# Patient Record
Sex: MALE | Race: WHITE | NOT HISPANIC OR LATINO | Employment: OTHER | URBAN - NONMETROPOLITAN AREA
[De-identification: names, ages, dates, MRNs, and addresses within clinical notes are randomized per-mention and may not be internally consistent; named-entity substitution may affect disease eponyms.]

---

## 2017-07-20 ENCOUNTER — AMBULATORY - GICH (OUTPATIENT)
Dept: UROLOGY | Facility: OTHER | Age: 67
End: 2017-07-20

## 2017-07-20 ENCOUNTER — HISTORY (OUTPATIENT)
Dept: UROLOGY | Facility: OTHER | Age: 67
End: 2017-07-20

## 2017-07-20 DIAGNOSIS — Z85.51 PERSONAL HISTORY OF MALIGNANT NEOPLASM OF BLADDER: ICD-10-CM

## 2017-12-28 NOTE — PROGRESS NOTES
Patient Information     Patient Name MRN Sex     Jayy Cole [id number] Male 1950      Progress Notes by Ernesto Vasques MD at 2017  8:30 AM     Author:  Ernesto Vasques MD Service:  (none) Author Type:  Physician     Filed:  2017  9:19 AM Encounter Date:  2017 Status:  Signed     :  Ernesto Vasques MD (Physician)            PCP:  Dr Frederic Venegas    Preprocedure diagnosis  History of bladder cancer    Postprocedure diagnosis  History of bladder cancer    Procedure  Flexible Cystourethroscopy    Surgeon  Ernesto Vasques MD    Anesthesia  2% lidocaine jelly intraurethrally    Complications  None    Indications  67 y.o. male undergoing a flexible cystoscopy for the above mentioned indications.    Findings  Cystoscopic findings included a normal anterior urethra.    There was not a prominent median lobe.    The lateral lobes were not obstructive in appearance.  The bladder appeared to be normal capacity.    There were no tumors, stones or foreign bodies.    The orifices were slit-shaped and in their normal location.    Procedure  The patient was placed in supine position and prepped and draped in sterile fashion with lidocaine jelly per urethra for anesthesia.    I passed a lubricated 14F flexible cystoscope through the penile urethra and into the bladder and the bladder was completely visualized.  The cystoscope was retroflexed and the bladder neck and prostate visualized.    The cystoscope was slowly withdrawn while visualizing the urethra and the procedure terminated.    The patient tolerated the procedure well.      Plan  Cystoscopy in 1 year

## 2017-12-28 NOTE — PATIENT INSTRUCTIONS
Patient Information     Patient Name MRN Sex Jayy Piper [id number] Male 1950      Patient Instructions by Brenda Bell RN at 2017  8:30 AM     Author:  Brenda Bell RN Service:  (none) Author Type:  NURS- Registered Nurse     Filed:  2017  9:08 AM Encounter Date:  2017 Status:  Signed     :  Brenda Bell RN (NURS- Registered Nurse)            Home Care after Cystoscopy  Follow these guidelines for your care after your procedure.    Activity  No limitations    Bathing or showering  No limitations    Symptoms  You may notice some burning with urination but this usually resolves after 1-2 days.  You may also notice small amounts of blood in your urine.  Please increase water intake for the next few days to help with these symptoms.    Contacts  General Questions: [phone]  Appointments:  [phone]  Emergencies:  [phone]    When to call the clinic  If you develop any of the following symptoms please call the clinic immediately.  If the clinic is closed please be seen at an urgent care clinic or the Emergency Department.  - Burning with urination that worsens after 2 days  - Unable to urinate causing severe pelvic pain  - Fevers of greater than 101 degrees F  - Flank pain that is not responding to pain medication    Follow up  Please follow up as discussed at the appointment.

## 2017-12-30 NOTE — NURSING NOTE
Patient Information     Patient Name MRN Sex     Jayy Cole [id number] Male 1950      Nursing Note by Brenda Bell RN at 2017  8:30 AM     Author:  Brenda Bell RN Service:  (none) Author Type:  NURS- Registered Nurse     Filed:  2017  9:16 AM Encounter Date:  2017 Status:  Signed     :  Brenda Bell RN (NURS- Registered Nurse)            Patient positioned in supine position, perineum area prepped with chlorhexidene Gluconate and patient draped per sterile technique. Per verbal order read back by Ernesto Vasques MD, Urojet 10mL 2% lidocaine jelly to be instilled into urethra.  Urojet- 10ml 2% Lidocaine jelly instilled into the urethra.    Urojet 2%  Lot#: [id number]  Expiration date: 3/19  : [employer]  NDC: [address]-0    [place] Protocol    A. Pre-procedure verification complete yes  1-relevant information / documentation available, reviewed and properly matched to the patient; 2-consent accurate and complete, 3-equipment and supplies available    B. Site marking complete N/A  Site marked if not in continuous attendance with patient    C. TIME OUT completed yes  Time Out was conducted just prior to starting procedure to verify the eight required elements: 1-patient identity, 2-consent accurate and complete, 3-position, 4-correct side/site marked (if applicable), 5-procedure, 6-relevant images / results properly labeled and displayed (if applicable), 7-antibiotics / irrigation fluids (if applicable), 8-safety precautions.    After procedure perineum area rinsed. Discharge instructions reviewed with patient. Patient verbalized understanding of discharge instructions and discharged ambulatory.

## 2018-01-24 ENCOUNTER — DOCUMENTATION ONLY (OUTPATIENT)
Dept: FAMILY MEDICINE | Facility: OTHER | Age: 68
End: 2018-01-24

## 2018-01-24 PROBLEM — F20.9 SCHIZOPHRENIA (H): Status: ACTIVE | Noted: 2018-01-24

## 2018-01-24 PROBLEM — J44.9 COPD (CHRONIC OBSTRUCTIVE PULMONARY DISEASE) (H): Status: ACTIVE | Noted: 2018-01-24

## 2018-01-24 PROBLEM — I49.9 CARDIAC ARRHYTHMIA: Status: ACTIVE | Noted: 2018-01-24

## 2018-01-24 RX ORDER — ACETAMINOPHEN 500 MG
1000 TABLET ORAL 2 TIMES DAILY
COMMUNITY

## 2018-01-24 RX ORDER — FLUPHENAZINE HYDROCHLORIDE 5 MG/1
5 TABLET ORAL 2 TIMES DAILY
Status: ON HOLD | COMMUNITY
End: 2024-04-28

## 2018-01-24 RX ORDER — IBUPROFEN 800 MG/1
1 TABLET, FILM COATED ORAL 3 TIMES DAILY PRN
COMMUNITY
End: 2024-04-28

## 2018-01-24 RX ORDER — ALENDRONATE SODIUM 70 MG/1
70 TABLET ORAL
COMMUNITY
End: 2024-06-07

## 2018-01-27 VITALS — HEART RATE: 80 BPM | BODY MASS INDEX: 19.82 KG/M2 | RESPIRATION RATE: 16 BRPM | HEIGHT: 68 IN | WEIGHT: 130.8 LBS

## 2018-07-23 NOTE — PROGRESS NOTES
Patient Information     Patient Name  Jayy Cole MRN  [id number] Sex  Male   1950      Letter by Ernesto Vasques MD at      Author:  Ernesto Vasques MD Service:  (none) Author Type:  (none)    Filed:   Encounter Date:  2017 Status:  (Other)           Frederic Venegas MD  [address]  [address]  [place] [address]          2017    Re:  Jayy Cole       : 1950  [address]  [facility] [address]    Appointment Date: 17      Dear  Dr Venegas,    Thank you for allowing me to take part in this patient s care.  I copied my note below from today's clinic visit for your records.  Please feel free to contact me with any questions      Warm regards,            Ernesto Vasques MD, PharmD, [facility]  Urologist  [address] [place] [address]  Appointments: [phone]                    PCP:  Dr Frederic Venegas    Preprocedure diagnosis  History of bladder cancer    Postprocedure diagnosis  History of bladder cancer    Procedure  Flexible Cystourethroscopy    Surgeon  Ernesto Vasques MD    Anesthesia  2% lidocaine jelly intraurethrally    Complications  None    Indications  67 y.o. male undergoing a flexible cystoscopy for the above mentioned indications.    Findings  Cystoscopic findings included a normal anterior urethra.    There was not a prominent median lobe.    The lateral lobes were not obstructive in appearance.  The bladder appeared to be normal capacity.    There were no tumors, stones or foreign bodies.    The orifices were slit-shaped and in their normal location.    Procedure  The patient was placed in supine position and prepped and draped in sterile fashion with lidocaine jelly per urethra for anesthesia.    I passed a lubricated 14F flexible cystoscope through the penile urethra and into the bladder and the bladder was completely visualized.  The cystoscope was retroflexed and the bladder neck and prostate visualized.    The cystoscope was slowly withdrawn while  visualizing the urethra and the procedure terminated.    The patient tolerated the procedure well.      Plan  Surveillance cystoscopy annually

## 2018-08-16 ENCOUNTER — OFFICE VISIT (OUTPATIENT)
Dept: UROLOGY | Facility: OTHER | Age: 68
End: 2018-08-16
Attending: UROLOGY
Payer: MEDICARE

## 2018-08-16 VITALS — WEIGHT: 134.4 LBS | HEART RATE: 98 BPM | HEIGHT: 68 IN | BODY MASS INDEX: 20.37 KG/M2 | RESPIRATION RATE: 14 BRPM

## 2018-08-16 DIAGNOSIS — Z85.51 PERSONAL HISTORY OF MALIGNANT NEOPLASM OF BLADDER: Primary | ICD-10-CM

## 2018-08-16 PROCEDURE — 52000 CYSTOURETHROSCOPY: CPT

## 2018-08-16 PROCEDURE — G0463 HOSPITAL OUTPT CLINIC VISIT: HCPCS | Mod: 25

## 2018-08-16 PROCEDURE — 52000 CYSTOURETHROSCOPY: CPT | Performed by: UROLOGY

## 2018-08-16 ASSESSMENT — PAIN SCALES - GENERAL: PAINLEVEL: NO PAIN (0)

## 2018-08-16 NOTE — MR AVS SNAPSHOT
After Visit Summary   8/16/2018    Jayy Cole    MRN: [id number]           Patient Information     Date Of Birth          1950        Visit Information        Provider Department      8/16/2018 10:00 AM Ernesto Vasques MD [facility]        Care Instructions    Home Care after Cystoscopy  Follow these guidelines for your care after your procedure.    Activity  No limitations    Bathing or showering  No limitations    Symptoms  You may notice some burning with urination but this usually resolves after 1-2 days.  You may also notice small amounts of blood in your urine.  Please increase water intake for the next few days to help with these symptoms.    Contacts  General Questions: [phone]  Appointments:  [phone]  Emergencies:  [phone]    When to call the clinic  If you develop any of the following symptoms please call the clinic immediately.  If the clinic is closed please be seen at an urgent care clinic or the Emergency Department.  - Burning with urination that worsens after 2 days  - Unable to urinate causing severe pelvic pain  - Fevers of greater than 101 degrees F  - Flank pain that is not responding to pain medication    Follow up  Please follow up as discussed at the appointment.            Follow-ups after your visit        Who to contact     If you have questions or need follow up information about today's clinic visit or your schedule please contact [facility] directly at [phone].  Normal or non-critical lab and imaging results will be communicated to you by MyChart, letter or phone within 4 business days after the clinic has received the results. If you do not hear from us within 7 days, please contact the clinic through MyChart or phone. If you have a critical or abnormal lab result, we will notify you by phone as soon as possible.  Submit refill requests through [employer] or call your pharmacy and they will forward the  refill request to us. Please allow 3 business days for your refill to be completed.          Additional Information About Your Visit        Care EveryWhere ID     This is your Care EveryWhere ID. This could be used by other organizations to access your [facility] medical records  [id number]         Blood Pressure from Last 3 Encounters:   No data found for BP    Weight from Last 3 Encounters:   07/20/17 59.3 kg (130 lb 12.8 oz)   07/27/16 62.8 kg (138 lb 6.4 oz)   04/18/14 63 kg (139 lb)              Today, you had the following     No orders found for display       Primary Care Provider Office Phone # Fax #    Skyler Velasquez [phone]       [address] [place] [address]        Equal Access to Services     SURESH CORTÉS : Hadii cameron donis hadasho Soomaali, waaxda luqadaha, qaybta kaalmada adeegyada, waxellyn herrera haysoraya rodrigez . So [facility] [phone].    ATENCIÓN: Si habla español, tiene a purvis disposición servicios gratuitos de asistencia lingüística. Llame al [phone].    We comply with applicable federal civil rights laws and [place] laws. We do not discriminate on the basis of race, color, national origin, age, disability, sex, sexual orientation, or gender identity.            Thank you!     Thank you for choosing [facility]  for your care. Our goal is always to provide you with excellent care. Hearing back from our patients is one way we can continue to improve our services. Please take a few minutes to complete the written survey that you may receive in the mail after your visit with us. Thank you!             Your Updated Medication List - Protect others around you: Learn how to safely use, store and throw away your medicines at www.disposemymeds.org.          This list is accurate as of 8/16/18 10:28 AM.  Always use your most recent med list.                   Brand Name Dispense Instructions for use Diagnosis     acetaminophen 500 MG tablet    TYLENOL     Take 1,000 mg by mouth 2 times daily as needed        alendronate 70 MG tablet    FOSAMAX     Take 70 mg by mouth every 7 days        aspirin 81 MG tablet      Take 81 mg by mouth daily with food        fluPHENAZine 5 MG tablet    PROLIXIN     Take 5 mg by mouth 2 times daily        ibuprofen 800 MG tablet    ADVIL/MOTRIN     Take 1 tablet by mouth 3 times daily as needed

## 2018-08-16 NOTE — PATIENT INSTRUCTIONS

## 2018-08-16 NOTE — PROGRESS NOTES
Preprocedure diagnosis  History of bladder cancer    Postprocedure diagnosis  History of bladder cancer    Procedure  Flexible Cystourethroscopy    Surgeon  Ernesto Vasques MD    Anesthesia  2% lidocaine jelly intraurethrally    Complications  None    Indications  68 y.o. male undergoing a flexible cystoscopy for the above mentioned indications.    Findings  Cystoscopic findings included a normal anterior urethra.    There was not a prominent median lobe.    The lateral lobes were not obstructive in appearance.  The bladder appeared to be normal capacity.    There were no tumors, stones or foreign bodies.    The orifices were slit-shaped and in their normal location.    Procedure  The patient was placed in supine position and prepped and draped in sterile fashion with lidocaine jelly per urethra for anesthesia.    I passed a lubricated 14F flexible cystoscope through the penile urethra and into the bladder and the bladder was completely visualized.  The cystoscope was retroflexed and the bladder neck and prostate visualized.    The cystoscope was slowly withdrawn while visualizing the urethra and the procedure terminated.    The patient tolerated the procedure well.      Plan  Annual surveillance cystoscopy   -He did mention blood per stool and I recommended following up with PCP/general surgeon to consider colonoscopy

## 2018-08-16 NOTE — NURSING NOTE
Patient positioned in supine position, perineum area prepped with chlorhexidene Gluconate and patient draped per sterile technique. Per verbal order read back by Ernesto Vasques MD, Urojet 10mL 2% lidocaine jelly to be instilled into urethra.  Urojet- 10ml 2% Lidocaine jelly instilled into the urethra.    Urojet 2%  Lot#: [id number]  Expiration date: 03/2020  : [employer]  NDC: [address]-1    [place] Protocol    A. Pre-procedure verification complete Yes  1-relevant information / documentation available, reviewed and properly matched to the patient; 2-consent accurate and complete, 3-equipment and supplies available    B. Site marking complete N/A  Site marked if not in continuous attendance with patient    C. TIME OUT completed Yes  Time Out was conducted just prior to starting procedure to verify the eight required elements: 1-patient identity, 2-consent accurate and complete, 3-position, 4-correct side/site marked (if applicable), 5-procedure, 6-relevant images / results properly labeled and displayed (if applicable), 7-antibiotics / irrigation fluids (if applicable), 8-safety precautions.    After procedure perineum area rinsed. Discharge instructions reviewed with patient. Patient verbalized understanding of discharge instructions and discharged ambulatory.  Anna Schmitt..................8/16/2018  11:24 AM

## 2019-07-29 ENCOUNTER — OFFICE VISIT (OUTPATIENT)
Dept: UROLOGY | Facility: OTHER | Age: 69
End: 2019-07-29
Attending: UROLOGY
Payer: MEDICARE

## 2019-07-29 VITALS — BODY MASS INDEX: 20.25 KG/M2 | RESPIRATION RATE: 12 BRPM | HEART RATE: 92 BPM | WEIGHT: 133.2 LBS

## 2019-07-29 DIAGNOSIS — Z85.51 HISTORY OF BLADDER CANCER: Primary | ICD-10-CM

## 2019-07-29 PROCEDURE — 52000 CYSTOURETHROSCOPY: CPT | Performed by: UROLOGY

## 2019-07-29 PROCEDURE — G0463 HOSPITAL OUTPT CLINIC VISIT: HCPCS | Mod: 25

## 2019-07-29 RX ORDER — MIRTAZAPINE 15 MG/1
15 TABLET, FILM COATED ORAL AT BEDTIME
Status: ON HOLD | COMMUNITY
End: 2024-04-28

## 2019-07-29 RX ORDER — CHLORPROMAZINE HYDROCHLORIDE 25 MG/1
25 TABLET, FILM COATED ORAL AT BEDTIME
Status: ON HOLD | COMMUNITY
End: 2024-04-29

## 2019-07-29 RX ORDER — ALBUTEROL SULFATE 90 UG/1
2 AEROSOL, METERED RESPIRATORY (INHALATION) 2 TIMES DAILY
COMMUNITY
End: 2024-06-07

## 2019-07-29 ASSESSMENT — PAIN SCALES - GENERAL: PAINLEVEL: EXTREME PAIN (8)

## 2019-07-29 NOTE — NURSING NOTE
Patient positioned in supine position, perineum area prepped with chlorhexidene Gluconate and patient draped per sterile technique. Per verbal order read back by Ernesto Vasques MD, Urojet 10mL 2% lidocaine jelly to be instilled into urethra.  Urojet- 10ml 2% Lidocaine jelly instilled into the urethra.    Urojet 2%  Lot#: [id number]  Expiration date: 3/21  : [employer]  NDC: [address]-9    [place] Protocol    A. Pre-procedure verification complete Yes  1-relevant information / documentation available, reviewed and properly matched to the patient; 2-consent accurate and complete, 3-equipment and supplies available    B. Site marking complete N/A  Site marked if not in continuous attendance with patient    C. TIME OUT completed Yes  Time Out was conducted just prior to starting procedure to verify the eight required elements: 1-patient identity, 2-consent accurate and complete, 3-position, 4-correct side/site marked (if applicable), 5-procedure, 6-relevant images / results properly labeled and displayed (if applicable), 7-antibiotics / irrigation fluids (if applicable), 8-safety precautions.    After procedure perineum area rinsed. Discharge instructions reviewed with patient. Patient verbalized understanding of discharge instructions and discharged ambulatory.  Brenda Bell..................7/29/2019  2:30 PM

## 2019-07-29 NOTE — PROGRESS NOTES
Preprocedure diagnosis  History of bladder cancer    Postprocedure diagnosis  History of bladder cancer    Procedure  Flexible Cystourethroscopy    Surgeon  Ernesto Vasques MD    Anesthesia  2% lidocaine jelly intraurethrally    Complications  None    Indications  The patient is undergoing a flexible cystoscopy for the above mentioned indications.    Findings  Cystoscopic findings included a normal anterior urethra.    There was not a prominent median lobe.    The lateral lobes were not obstructive in appearance.  The bladder appeared to be normal capacity.    There were no tumors, stones or foreign bodies.    The orifices were slit-shaped and in their normal location.    Procedure  The patient was placed in supine position and prepped and draped in sterile fashion with lidocaine jelly per urethra for anesthesia.    I passed a lubricated 14F flexible cystoscope through the penile urethra and into the bladder and the bladder was completely visualized.  The cystoscope was retroflexed and the bladder neck and prostate visualized.    The cystoscope was slowly withdrawn while visualizing the urethra and the procedure terminated.    The patient tolerated the procedure well.      Plan  Annual surveillance cystoscopy

## 2019-07-29 NOTE — PATIENT INSTRUCTIONS

## 2020-03-03 ENCOUNTER — OFFICE VISIT (OUTPATIENT)
Dept: OTOLARYNGOLOGY | Facility: OTHER | Age: 70
End: 2020-03-03
Attending: OTOLARYNGOLOGY
Payer: MEDICARE

## 2020-03-03 DIAGNOSIS — K14.8 OTHER DISEASES OF TONGUE: Primary | ICD-10-CM

## 2020-03-03 PROCEDURE — G0463 HOSPITAL OUTPT CLINIC VISIT: HCPCS

## 2020-03-10 NOTE — PROGRESS NOTES
document embedded image  Patient Name: Jayy Cole    Address: [address]    YOB: 1950    [place] [address]    MR Number: [id number]    Phone: [phone]  PCP: Hernando Clifford PA-C            Appointment Date: 03/03/20   Visit Provider: Andrew See MD    cc: Hernando Clifford PA-C; ~    ENT Progress Note    Visit Reasons: Lesion on Tongue/consult    HPI  History of Present Illness  Chief complaint:  Tongue lesion    History  The patient is a 69-year-old man who was sent in today for evaluation of a tongue lesion.  This is on his right posterior lateral tongue.  He feels that has resolved.  He has no lingering symptoms at this time.    Exam  Neck-no masses or adenopathy  Nasal-no obstruction or purulence  Oral cavity oropharynx-no mucosal lesions noted  Bimanual exam-no palpable floor of mouth, tongue, tongue base, tonsillar fossa lesions  Indirect laryngoscopy-Clear hypopharynx and larynx  Head and neck integument-Clear  General-the patient appears well and in no distress  Neuro-there are no focal cranial nerve deficits    A&P  Assessment & Plan  (1) Tongue lesion:        Status: Acute        Code(s):  K14.8 - Other diseases of tongue        The patient was reassured that there is no residual tongue lesion at this time.  He is welcome to follow up if he has progressive problems in the future.      Andrew See MD    03/03/20 [id number]    <Electronically signed by Andrew See MD> 03/09/20 2528

## 2020-08-05 ENCOUNTER — OFFICE VISIT (OUTPATIENT)
Dept: UROLOGY | Facility: OTHER | Age: 70
End: 2020-08-05
Attending: UROLOGY
Payer: MEDICARE

## 2020-08-05 VITALS — RESPIRATION RATE: 18 BRPM | HEART RATE: 84 BPM | BODY MASS INDEX: 19.92 KG/M2 | WEIGHT: 131 LBS

## 2020-08-05 DIAGNOSIS — Z85.51 PERSONAL HISTORY OF MALIGNANT NEOPLASM OF BLADDER: Primary | ICD-10-CM

## 2020-08-05 PROCEDURE — 52000 CYSTOURETHROSCOPY: CPT | Performed by: UROLOGY

## 2020-08-05 PROCEDURE — G0463 HOSPITAL OUTPT CLINIC VISIT: HCPCS | Mod: 25

## 2020-08-05 ASSESSMENT — PAIN SCALES - GENERAL: PAINLEVEL: NO PAIN (0)

## 2020-08-05 NOTE — NURSING NOTE
Patient positioned in supine position, perineum area prepped with chlorhexidene Gluconate and patient draped per sterile technique. Per verbal order read back by Ernesto Vasques MD, Urojet 10mL 2% lidocaine jelly to be instilled into urethra.  Urojet- 10ml 2% Lidocaine jelly instilled into the urethra.    Urojet 2%  Lot#: [id number]  Expiration date: 03/2022  : [employer]  NDC: [address]-7    [place] Protocol    A. Pre-procedure verification complete Yes  1-relevant information / documentation available, reviewed and properly matched to the patient; 2-consent accurate and complete, 3-equipment and supplies available    B. Site marking complete N/A  Site marked if not in continuous attendance with patient    C. TIME OUT completed Yes  Time Out was conducted just prior to starting procedure to verify the eight required elements: 1-patient identity, 2-consent accurate and complete, 3-position, 4-correct side/site marked (if applicable), 5-procedure, 6-relevant images / results properly labeled and displayed (if applicable), 7-antibiotics / irrigation fluids (if applicable), 8-safety precautions.    After procedure perineum area rinsed. Discharge instructions reviewed with patient. Patient verbalized understanding of discharge instructions and discharged ambulatory.  Jeannie German RN..................8/5/2020  11:04 AM

## 2020-08-05 NOTE — PATIENT INSTRUCTIONS

## 2021-09-16 ENCOUNTER — OFFICE VISIT (OUTPATIENT)
Dept: UROLOGY | Facility: OTHER | Age: 71
End: 2021-09-16
Attending: UROLOGY
Payer: MEDICARE

## 2021-09-16 VITALS
WEIGHT: 124.8 LBS | HEART RATE: 78 BPM | BODY MASS INDEX: 18.98 KG/M2 | OXYGEN SATURATION: 96 % | RESPIRATION RATE: 16 BRPM | TEMPERATURE: 97.4 F

## 2021-09-16 DIAGNOSIS — Z85.51 PERSONAL HISTORY OF MALIGNANT NEOPLASM OF BLADDER: Primary | ICD-10-CM

## 2021-09-16 PROCEDURE — G0463 HOSPITAL OUTPT CLINIC VISIT: HCPCS

## 2021-09-16 PROCEDURE — 52000 CYSTOURETHROSCOPY: CPT | Performed by: UROLOGY

## 2021-09-16 ASSESSMENT — PAIN SCALES - GENERAL: PAINLEVEL: NO PAIN (0)

## 2021-09-16 NOTE — PATIENT INSTRUCTIONS

## 2021-09-16 NOTE — NURSING NOTE
Chief Complaint   Patient presents with     Procedure     Cystoscoy 1 year follow up    Patient presents to the clinic today for a procedure for Cystoscopy for 1 year follow up.    Patient positioned in supine position, perineum area prepped with chlorhexidene Gluconate and patient draped per sterile technique. Per verbal order read back by Ernesto Vasques MD, Urojet 10mL 2% lidocaine jelly to be instilled into urethra.  Urojet- 10ml 2% Lidocaine jelly instilled into the urethra.    Urojet 2%  Lot#: [id number]  Expiration date: 03/2023  : [employer]  NDC: [address]-9    [place] Protocol    A. Pre-procedure verification complete Yes  1-relevant information / documentation available, reviewed and properly matched to the patient; 2-consent accurate and complete, 3-equipment and supplies available    B. Site marking complete N/A  Site marked if not in continuous attendance with patient    C. TIME OUT completed Yes  Time Out was conducted just prior to starting procedure to verify the eight required elements: 1-patient identity, 2-consent accurate and complete, 3-position, 4-correct side/site marked (if applicable), 5-procedure, 6-relevant images / results properly labeled and displayed (if applicable), 7-antibiotics / irrigation fluids (if applicable), 8-safety precautions.    After procedure perineum area rinsed. Discharge instructions reviewed with patient. Patient verbalized understanding of discharge instructions and discharged ambulatory.  Milena Garcia LPN..................9/16/2021  12:43 PM  Medication Reconciliation: completed   Milena Garcia LPN  9/16/2021 12:42 PM

## 2023-01-05 ENCOUNTER — OFFICE VISIT (OUTPATIENT)
Dept: UROLOGY | Facility: OTHER | Age: 73
End: 2023-01-05
Attending: UROLOGY
Payer: MEDICARE

## 2023-01-05 VITALS — HEART RATE: 82 BPM | RESPIRATION RATE: 18 BRPM | OXYGEN SATURATION: 96 %

## 2023-01-05 DIAGNOSIS — Z85.51 PERSONAL HISTORY OF MALIGNANT NEOPLASM OF BLADDER: Primary | ICD-10-CM

## 2023-01-05 PROCEDURE — 52000 CYSTOURETHROSCOPY: CPT | Performed by: UROLOGY

## 2023-01-05 PROCEDURE — G0463 HOSPITAL OUTPT CLINIC VISIT: HCPCS | Mod: 25

## 2023-01-05 ASSESSMENT — PAIN SCALES - GENERAL: PAINLEVEL: NO PAIN (0)

## 2023-01-05 NOTE — NURSING NOTE
Patient positioned in supine position, perineum area prepped with chlorhexidene Gluconate and patient draped per sterile technique. Per verbal order read back by Ernesto Vasques MD, Urojet 10mL 2% lidocaine jelly to be instilled into urethra.  Urojet- 10ml 2% Lidocaine jelly instilled into the urethra.    Urojet 2%  Lot#: [id number]  Expiration date: 7/2024  : [employer]  NDC: [address]-8    [place] Protocol    A. Pre-procedure verification complete Yes  1-relevant information / documentation available, reviewed and properly matched to the patient; 2-consent accurate and complete, 3-equipment and supplies available    B. Site marking complete N/A  Site marked if not in continuous attendance with patient    C. TIME OUT completed Yes  Time Out was conducted just prior to starting procedure to verify the eight required elements: 1-patient identity, 2-consent accurate and complete, 3-position, 4-correct side/site marked (if applicable), 5-procedure, 6-relevant images / results properly labeled and displayed (if applicable), 7-antibiotics / irrigation fluids (if applicable), 8-safety precautions.    After procedure perineum area rinsed. Discharge instructions reviewed with patient. Patient verbalized understanding of discharge instructions and discharged ambulatory.  Puja Day LPN..................1/5/2023  10:39 AM

## 2023-01-05 NOTE — PATIENT INSTRUCTIONS

## 2024-04-27 ENCOUNTER — APPOINTMENT (OUTPATIENT)
Dept: CT IMAGING | Facility: OTHER | Age: 74
DRG: 093 | End: 2024-04-27
Attending: EMERGENCY MEDICINE
Payer: MEDICARE

## 2024-04-27 ENCOUNTER — HOSPITAL ENCOUNTER (INPATIENT)
Facility: OTHER | Age: 74
LOS: 1 days | Discharge: HOME | DRG: 093 | End: 2024-04-29
Attending: EMERGENCY MEDICINE | Admitting: INTERNAL MEDICINE
Payer: MEDICARE

## 2024-04-27 DIAGNOSIS — R41.0 CONFUSION: ICD-10-CM

## 2024-04-27 DIAGNOSIS — R29.90 STROKE-LIKE SYMPTOMS: ICD-10-CM

## 2024-04-27 DIAGNOSIS — R42 DIZZINESS AND GIDDINESS: Primary | ICD-10-CM

## 2024-04-27 DIAGNOSIS — R47.81 SLURRED SPEECH: ICD-10-CM

## 2024-04-27 DIAGNOSIS — Z86.73 PERSONAL HISTORY OF TRANSIENT CEREBRAL ISCHEMIA: ICD-10-CM

## 2024-04-27 LAB
ALBUMIN SERPL BCG-MCNC: 3.2 G/DL (ref 3.5–5.2)
ALP SERPL-CCNC: 54 U/L (ref 40–150)
ALT SERPL W P-5'-P-CCNC: 14 U/L (ref 0–70)
ANION GAP SERPL CALCULATED.3IONS-SCNC: 9 MMOL/L (ref 7–15)
AST SERPL W P-5'-P-CCNC: 13 U/L (ref 0–45)
BASOPHILS # BLD AUTO: 0.1 10E3/UL (ref 0–0.2)
BASOPHILS NFR BLD AUTO: 1 %
BILIRUB SERPL-MCNC: <0.2 MG/DL
BUN SERPL-MCNC: 30.7 MG/DL (ref 8–23)
CALCIUM SERPL-MCNC: 8 MG/DL (ref 8.8–10.2)
CHLORIDE SERPL-SCNC: 100 MMOL/L (ref 98–107)
CREAT SERPL-MCNC: 0.91 MG/DL (ref 0.67–1.17)
DEPRECATED HCO3 PLAS-SCNC: 25 MMOL/L (ref 22–29)
EGFRCR SERPLBLD CKD-EPI 2021: 89 ML/MIN/1.73M2
EOSINOPHIL # BLD AUTO: 0.2 10E3/UL (ref 0–0.7)
EOSINOPHIL NFR BLD AUTO: 3 %
ERYTHROCYTE [DISTWIDTH] IN BLOOD BY AUTOMATED COUNT: 14.4 % (ref 10–15)
GLUCOSE SERPL-MCNC: 119 MG/DL (ref 70–99)
HCT VFR BLD AUTO: 33.6 % (ref 40–53)
HGB BLD-MCNC: 11.1 G/DL (ref 13.3–17.7)
HOLD SPECIMEN: NORMAL
HOLD SPECIMEN: NORMAL
IMM GRANULOCYTES # BLD: 0.1 10E3/UL
IMM GRANULOCYTES NFR BLD: 1 %
INR PPP: 1.09 (ref 0.85–1.15)
LYMPHOCYTES # BLD AUTO: 1.3 10E3/UL (ref 0.8–5.3)
LYMPHOCYTES NFR BLD AUTO: 15 %
MCH RBC QN AUTO: 29.7 PG (ref 26.5–33)
MCHC RBC AUTO-ENTMCNC: 33 G/DL (ref 31.5–36.5)
MCV RBC AUTO: 90 FL (ref 78–100)
MONOCYTES # BLD AUTO: 0.9 10E3/UL (ref 0–1.3)
MONOCYTES NFR BLD AUTO: 11 %
NEUTROPHILS # BLD AUTO: 6 10E3/UL (ref 1.6–8.3)
NEUTROPHILS NFR BLD AUTO: 71 %
NRBC # BLD AUTO: 0 10E3/UL
NRBC BLD AUTO-RTO: 0 /100
PLATELET # BLD AUTO: 254 10E3/UL (ref 150–450)
POTASSIUM SERPL-SCNC: 3.2 MMOL/L (ref 3.4–5.3)
PROT SERPL-MCNC: 5.5 G/DL (ref 6.4–8.3)
RBC # BLD AUTO: 3.74 10E6/UL (ref 4.4–5.9)
SODIUM SERPL-SCNC: 134 MMOL/L (ref 135–145)
WBC # BLD AUTO: 8.4 10E3/UL (ref 4–11)

## 2024-04-27 PROCEDURE — 82435 ASSAY OF BLOOD CHLORIDE: CPT | Performed by: EMERGENCY MEDICINE

## 2024-04-27 PROCEDURE — 85610 PROTHROMBIN TIME: CPT | Performed by: EMERGENCY MEDICINE

## 2024-04-27 PROCEDURE — 250N000011 HC RX IP 250 OP 636: Performed by: EMERGENCY MEDICINE

## 2024-04-27 PROCEDURE — 70496 CT ANGIOGRAPHY HEAD: CPT | Mod: TC,MG

## 2024-04-27 PROCEDURE — G0508 CRIT CARE TELEHEA CONSULT 60: HCPCS | Mod: G0 | Performed by: PSYCHIATRY & NEUROLOGY

## 2024-04-27 PROCEDURE — 80061 LIPID PANEL: CPT | Performed by: INTERNAL MEDICINE

## 2024-04-27 PROCEDURE — 85004 AUTOMATED DIFF WBC COUNT: CPT | Performed by: EMERGENCY MEDICINE

## 2024-04-27 PROCEDURE — 99291 CRITICAL CARE FIRST HOUR: CPT | Mod: 25 | Performed by: EMERGENCY MEDICINE

## 2024-04-27 PROCEDURE — G1010 CDSM STANSON: HCPCS | Mod: TC

## 2024-04-27 PROCEDURE — 83735 ASSAY OF MAGNESIUM: CPT | Performed by: INTERNAL MEDICINE

## 2024-04-27 PROCEDURE — 99285 EMERGENCY DEPT VISIT HI MDM: CPT | Performed by: EMERGENCY MEDICINE

## 2024-04-27 PROCEDURE — 83036 HEMOGLOBIN GLYCOSYLATED A1C: CPT | Performed by: INTERNAL MEDICINE

## 2024-04-27 PROCEDURE — 36415 COLL VENOUS BLD VENIPUNCTURE: CPT | Performed by: EMERGENCY MEDICINE

## 2024-04-27 RX ORDER — IOPAMIDOL 755 MG/ML
75 INJECTION, SOLUTION INTRAVASCULAR ONCE
Status: COMPLETED | OUTPATIENT
Start: 2024-04-27 | End: 2024-04-27

## 2024-04-27 RX ADMIN — IOPAMIDOL 75 ML: 755 INJECTION, SOLUTION INTRAVENOUS at 21:54

## 2024-04-27 ASSESSMENT — ACTIVITIES OF DAILY LIVING (ADL)
ADLS_ACUITY_SCORE: 35
ADLS_ACUITY_SCORE: 35

## 2024-04-28 ENCOUNTER — APPOINTMENT (OUTPATIENT)
Dept: PHYSICAL THERAPY | Facility: OTHER | Age: 74
DRG: 093 | End: 2024-04-28
Attending: INTERNAL MEDICINE
Payer: MEDICARE

## 2024-04-28 ENCOUNTER — APPOINTMENT (OUTPATIENT)
Dept: OCCUPATIONAL THERAPY | Facility: OTHER | Age: 74
DRG: 093 | End: 2024-04-28
Attending: INTERNAL MEDICINE
Payer: MEDICARE

## 2024-04-28 PROBLEM — R47.81 SLURRED SPEECH: Status: ACTIVE | Noted: 2024-04-28
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
## 2024-04-28 PROBLEM — R29.90 STROKE-LIKE SYMPTOMS: Status: ACTIVE | Noted: 2024-04-28

## 2024-04-28 LAB
CHOLEST SERPL-MCNC: 155 MG/DL
GLUCOSE BLDC GLUCOMTR-MCNC: 83 MG/DL (ref 70–99)
GLUCOSE BLDC GLUCOMTR-MCNC: 88 MG/DL (ref 70–99)
GLUCOSE BLDC GLUCOMTR-MCNC: 95 MG/DL (ref 70–99)
HBA1C MFR BLD: 5.4 % (ref 4–6.2)
HDLC SERPL-MCNC: 56 MG/DL
LDLC SERPL CALC-MCNC: 92 MG/DL
MAGNESIUM SERPL-MCNC: 2 MG/DL (ref 1.7–2.3)
NONHDLC SERPL-MCNC: 99 MG/DL
POTASSIUM SERPL-SCNC: 4.3 MMOL/L (ref 3.4–5.3)
TRIGL SERPL-MCNC: 37 MG/DL

## 2024-04-28 PROCEDURE — 36415 COLL VENOUS BLD VENIPUNCTURE: CPT | Performed by: INTERNAL MEDICINE

## 2024-04-28 PROCEDURE — 120N000001 HC R&B MED SURG/OB

## 2024-04-28 PROCEDURE — 250N000011 HC RX IP 250 OP 636: Performed by: INTERNAL MEDICINE

## 2024-04-28 PROCEDURE — 97530 THERAPEUTIC ACTIVITIES: CPT | Mod: GP

## 2024-04-28 PROCEDURE — 999N000157 HC STATISTIC RCP TIME EA 10 MIN

## 2024-04-28 PROCEDURE — 97535 SELF CARE MNGMENT TRAINING: CPT | Mod: GO | Performed by: OCCUPATIONAL THERAPIST

## 2024-04-28 PROCEDURE — 99222 1ST HOSP IP/OBS MODERATE 55: CPT | Performed by: INTERNAL MEDICINE

## 2024-04-28 PROCEDURE — 250N000013 HC RX MED GY IP 250 OP 250 PS 637: Performed by: INTERNAL MEDICINE

## 2024-04-28 PROCEDURE — 97165 OT EVAL LOW COMPLEX 30 MIN: CPT | Mod: GO | Performed by: OCCUPATIONAL THERAPIST

## 2024-04-28 PROCEDURE — 97116 GAIT TRAINING THERAPY: CPT | Mod: GP

## 2024-04-28 PROCEDURE — 84132 ASSAY OF SERUM POTASSIUM: CPT | Performed by: INTERNAL MEDICINE

## 2024-04-28 PROCEDURE — 97161 PT EVAL LOW COMPLEX 20 MIN: CPT | Mod: GP

## 2024-04-28 RX ORDER — FLUTICASONE PROPIONATE 50 MCG
1 SPRAY, SUSPENSION (ML) NASAL DAILY
COMMUNITY
End: 2024-06-07

## 2024-04-28 RX ORDER — ONDANSETRON 2 MG/ML
4 INJECTION INTRAMUSCULAR; INTRAVENOUS EVERY 6 HOURS PRN
Status: DISCONTINUED | OUTPATIENT
Start: 2024-04-28 | End: 2024-04-29 | Stop reason: HOSPADM

## 2024-04-28 RX ORDER — IPRATROPIUM BROMIDE AND ALBUTEROL 20; 100 UG/1; UG/1
1 SPRAY, METERED RESPIRATORY (INHALATION) 2 TIMES DAILY
Status: ON HOLD | COMMUNITY
End: 2024-04-29

## 2024-04-28 RX ORDER — POTASSIUM CHLORIDE 7.45 MG/ML
10 INJECTION INTRAVENOUS
Status: COMPLETED | OUTPATIENT
Start: 2024-04-28 | End: 2024-04-28

## 2024-04-28 RX ORDER — CLOPIDOGREL BISULFATE 75 MG/1
75 TABLET ORAL EVERY MORNING
Status: DISCONTINUED | OUTPATIENT
Start: 2024-04-28 | End: 2024-04-29 | Stop reason: HOSPADM

## 2024-04-28 RX ORDER — ASPIRIN 81 MG/1
81 TABLET ORAL DAILY
Status: DISCONTINUED | OUTPATIENT
Start: 2024-04-28 | End: 2024-04-29 | Stop reason: HOSPADM

## 2024-04-28 RX ORDER — ONDANSETRON 4 MG/1
4 TABLET, ORALLY DISINTEGRATING ORAL EVERY 6 HOURS PRN
Status: DISCONTINUED | OUTPATIENT
Start: 2024-04-28 | End: 2024-04-29 | Stop reason: HOSPADM

## 2024-04-28 RX ORDER — ACETAMINOPHEN 325 MG/1
650 TABLET ORAL EVERY 4 HOURS PRN
Status: DISCONTINUED | OUTPATIENT
Start: 2024-04-28 | End: 2024-04-29 | Stop reason: HOSPADM

## 2024-04-28 RX ORDER — LABETALOL HYDROCHLORIDE 5 MG/ML
10-40 INJECTION, SOLUTION INTRAVENOUS EVERY 10 MIN PRN
Status: DISCONTINUED | OUTPATIENT
Start: 2024-04-28 | End: 2024-04-29 | Stop reason: HOSPADM

## 2024-04-28 RX ORDER — FLUTICASONE FUROATE AND VILANTEROL 100; 25 UG/1; UG/1
1 POWDER RESPIRATORY (INHALATION) DAILY
Status: DISCONTINUED | OUTPATIENT
Start: 2024-04-28 | End: 2024-04-29 | Stop reason: HOSPADM

## 2024-04-28 RX ORDER — QUETIAPINE FUMARATE 300 MG/1
300 TABLET, FILM COATED ORAL AT BEDTIME
COMMUNITY
End: 2024-06-07

## 2024-04-28 RX ORDER — CHLORPROMAZINE HYDROCHLORIDE 25 MG/1
25 TABLET, FILM COATED ORAL AT BEDTIME
Status: DISCONTINUED | OUTPATIENT
Start: 2024-04-28 | End: 2024-04-28

## 2024-04-28 RX ORDER — ENOXAPARIN SODIUM 100 MG/ML
40 INJECTION SUBCUTANEOUS EVERY 24 HOURS
Status: DISCONTINUED | OUTPATIENT
Start: 2024-04-28 | End: 2024-04-29 | Stop reason: HOSPADM

## 2024-04-28 RX ORDER — AMOXICILLIN 250 MG
1 CAPSULE ORAL 2 TIMES DAILY
Status: DISCONTINUED | OUTPATIENT
Start: 2024-04-28 | End: 2024-04-29 | Stop reason: HOSPADM

## 2024-04-28 RX ORDER — FLUTICASONE PROPIONATE AND SALMETEROL 250; 50 UG/1; UG/1
1 POWDER RESPIRATORY (INHALATION) 2 TIMES DAILY
COMMUNITY
Start: 2024-04-26 | End: 2024-05-14 | Stop reason: DRUGHIGH

## 2024-04-28 RX ORDER — IBUPROFEN 600 MG/1
1 TABLET, FILM COATED ORAL 3 TIMES DAILY PRN
COMMUNITY
Start: 2024-04-26 | End: 2024-06-07

## 2024-04-28 RX ORDER — ACETAMINOPHEN 650 MG/20.3ML
650 LIQUID ORAL EVERY 4 HOURS PRN
Status: DISCONTINUED | OUTPATIENT
Start: 2024-04-28 | End: 2024-04-29 | Stop reason: HOSPADM

## 2024-04-28 RX ORDER — QUETIAPINE FUMARATE 100 MG/1
300 TABLET, FILM COATED ORAL AT BEDTIME
Status: DISCONTINUED | OUTPATIENT
Start: 2024-04-28 | End: 2024-04-29 | Stop reason: HOSPADM

## 2024-04-28 RX ORDER — HYDRALAZINE HYDROCHLORIDE 20 MG/ML
10-20 INJECTION INTRAMUSCULAR; INTRAVENOUS
Status: DISCONTINUED | OUTPATIENT
Start: 2024-04-28 | End: 2024-04-29 | Stop reason: HOSPADM

## 2024-04-28 RX ORDER — QUETIAPINE FUMARATE 300 MG/1
300 TABLET, FILM COATED ORAL AT BEDTIME
Status: ON HOLD | COMMUNITY
Start: 2024-04-26 | End: 2024-04-28

## 2024-04-28 RX ORDER — ACETAMINOPHEN 650 MG/1
650 SUPPOSITORY RECTAL EVERY 4 HOURS PRN
Status: DISCONTINUED | OUTPATIENT
Start: 2024-04-28 | End: 2024-04-29 | Stop reason: HOSPADM

## 2024-04-28 RX ORDER — ATORVASTATIN CALCIUM 40 MG/1
40 TABLET, FILM COATED ORAL DAILY
Status: DISCONTINUED | OUTPATIENT
Start: 2024-04-28 | End: 2024-04-29 | Stop reason: HOSPADM

## 2024-04-28 RX ORDER — CALCIUM CARBONATE/VITAMIN D3 600 MG-10
1 TABLET ORAL 3 TIMES DAILY
COMMUNITY
End: 2024-06-07

## 2024-04-28 RX ORDER — ATORVASTATIN CALCIUM 40 MG/1
40 TABLET, FILM COATED ORAL AT BEDTIME
COMMUNITY
End: 2024-06-07

## 2024-04-28 RX ORDER — AMOXICILLIN 250 MG
2 CAPSULE ORAL 2 TIMES DAILY
Status: DISCONTINUED | OUTPATIENT
Start: 2024-04-28 | End: 2024-04-29 | Stop reason: HOSPADM

## 2024-04-28 RX ORDER — LATANOPROST 50 UG/ML
1 SOLUTION/ DROPS OPHTHALMIC DAILY
COMMUNITY
End: 2024-06-07

## 2024-04-28 RX ORDER — CLOPIDOGREL BISULFATE 75 MG/1
75 TABLET ORAL EVERY MORNING
COMMUNITY
End: 2024-06-07

## 2024-04-28 RX ORDER — MULTIPLE VITAMINS W/ MINERALS TAB 9MG-400MCG
1 TAB ORAL DAILY
COMMUNITY
End: 2024-06-07

## 2024-04-28 RX ADMIN — ASPIRIN 81 MG: 81 TABLET, COATED ORAL at 18:00

## 2024-04-28 RX ADMIN — POTASSIUM CHLORIDE 10 MEQ: 7.46 INJECTION, SOLUTION INTRAVENOUS at 09:57

## 2024-04-28 RX ADMIN — ENOXAPARIN SODIUM 40 MG: 40 INJECTION SUBCUTANEOUS at 08:24

## 2024-04-28 RX ADMIN — POTASSIUM CHLORIDE 10 MEQ: 7.46 INJECTION, SOLUTION INTRAVENOUS at 07:21

## 2024-04-28 RX ADMIN — POTASSIUM CHLORIDE 10 MEQ: 7.46 INJECTION, SOLUTION INTRAVENOUS at 11:23

## 2024-04-28 RX ADMIN — POTASSIUM CHLORIDE 10 MEQ: 7.46 INJECTION, SOLUTION INTRAVENOUS at 08:32

## 2024-04-28 RX ADMIN — CLOPIDOGREL BISULFATE 75 MG: 75 TABLET, FILM COATED ORAL at 08:24

## 2024-04-28 RX ADMIN — DOCUSATE SODIUM 50 MG AND SENNOSIDES 8.6 MG 1 TABLET: 8.6; 5 TABLET, FILM COATED ORAL at 09:58

## 2024-04-28 RX ADMIN — ATORVASTATIN CALCIUM 40 MG: 40 TABLET, FILM COATED ORAL at 09:58

## 2024-04-28 ASSESSMENT — ACTIVITIES OF DAILY LIVING (ADL)
FALL_HISTORY_WITHIN_LAST_SIX_MONTHS: YES
ADLS_ACUITY_SCORE: 35
ADLS_ACUITY_SCORE: 43
ADLS_ACUITY_SCORE: 35
ADLS_ACUITY_SCORE: 43
ADLS_ACUITY_SCORE: 43
ADLS_ACUITY_SCORE: 35
VISION_MANAGEMENT: GLASSES
ADLS_ACUITY_SCORE: 35
ADLS_ACUITY_SCORE: 35
ADLS_ACUITY_SCORE: 43
NUMBER_OF_TIMES_PATIENT_HAS_FALLEN_WITHIN_LAST_SIX_MONTHS: 6
ADLS_ACUITY_SCORE: 43
ADLS_ACUITY_SCORE: 35
ADLS_ACUITY_SCORE: 43
ADLS_ACUITY_SCORE: 43
ADLS_ACUITY_SCORE: 35
ADLS_ACUITY_SCORE: 43
ADLS_ACUITY_SCORE: 43
ADLS_ACUITY_SCORE: 42

## 2024-04-28 NOTE — PHARMACY-ADMISSION MEDICATION HISTORY
Pharmacist Admission Medication History    Admission medication history is complete. The information provided in this note is only as accurate as the sources available at the time of the update.    Information Source(s): Facility (U/NH/) medication list/MAR and CareEverywhere/SureScripts via phone and chart/medication list review    Pertinent Information: Patient was residing at [facility] in [place] prior to moving in at [facility]. [facility] in [place] is working on sending a copy of his home medications, the list has not been received at this time. Nursing documented a medication list from [facility] and this has been used as a preliminary source for patient's home medication list. [facility] medication list was also used.     Changes made to PTA medication list:  Added:   Latanoprost eye drops  Combivent Respimat  Multivitamin  Flonase  Deleted:   Fluphenazine - no [facility] records, no record on [facility] list, no record on nurse [facility] list  Changed:   Ibuprofen to PRN - per [facility]  Albuterol to PRN - per [place]  Note  Quetiapine: documented as 600 mg dose on [facility]s and [place] Records - [facility] nursing note states 300mg, pending [facility] medication list - this will need to be confirmed and prescribing office contacted.    Chlorpromazine: no [facility] records, not documented at [facility] - listed on nurse medication list from [facility] - this will need to be confirmed and prescribing office contacted.     Allergies reviewed with [place] records and updates made in EHR: yes    Medication History Completed By: Xin Chase [facility] 4/28/2024 5:59 PM      PTA Med List   Medication Sig Last Dose    acetaminophen (TYLENOL) 500 MG tablet Take 1,000 mg by mouth 2 times daily as needed Unknown    albuterol (PROAIR HFA/PROVENTIL HFA/VENTOLIN HFA) 108 (90 Base) MCG/ACT inhaler Inhale 2 puffs into the lungs 2 times daily as needed Unknown    alendronate (FOSAMAX) 70 MG tablet Take 70 mg by  mouth every 7 days Past Week    aspirin 81 MG tablet Take 81 mg by mouth daily with food 4/27/2024    atorvastatin (LIPITOR) 40 MG tablet Take 40 mg by mouth daily 4/27/2024    calcium carbonate-vitamin D (CALTRATE) 600-10 MG-MCG per tablet Take 1 tablet by mouth 3 times daily     chlorproMAZINE (THORAZINE) 25 MG tablet Take 25 mg by mouth At Bedtime 4/27/2024    fluticasone (FLONASE) 50 MCG/ACT nasal spray Spray 1 spray into both nostrils daily     fluticasone-salmeterol (ADVAIR) 250-50 MCG/ACT inhaler Inhale 1 puff into the lungs 2 times daily     ibuprofen (ADVIL/MOTRIN) 600 MG tablet Take 1 tablet by mouth 3 times daily as needed     ipratropium-albuterol (COMBIVENT RESPIMAT)  MCG/ACT inhaler Inhale 1 puff into the lungs 2 times daily - may also take every 6 hours as needed     latanoprost (XALATAN) 0.005 % ophthalmic solution Place 1 drop into both eyes daily Unknown at na    multivitamin w/minerals (THERA-VIT-M) tablet Take 1 tablet by mouth daily     QUEtiapine (SEROQUEL) 300 MG tablet Take 300 mg by mouth at bedtime 4/27/2024

## 2024-04-28 NOTE — ED TRIAGE NOTES
Patient being evaluated today for stroke symptoms. Patient arrives from [facility]. At 2030, he experienced sudden onset of blurred vision and dizziness. This seemed to cause a fall to the ground. He did not hit his head per facility staff. Patient is anticoagulated. Code stroke called by EMS at 2126.

## 2024-04-28 NOTE — PLAN OF CARE
"NS[facility] ADMISSION NOTE    Patient admitted to room 315 at approximately [id number] via cart from emergency room. Patient was accompanied by transport tech.     Verbal SBAR report received from Anand LO prior to patient arrival.     Patient trasferred to bed via assist of 2 and slider sheet. Patient alert and oriented X 3. The patient is not having any pain.  . Admission vital signs: Blood pressure 132/78, pulse 65, temperature 97.8  F (36.6  C), temperature source Oral, resp. rate 16, height 1.727 m (5' 8\"), weight 58.4 kg (128 lb 12.8 oz), SpO2 96%. Patient was oriented to plan of care, call light, bed controls, tv, telephone, bathroom, and visiting hours.     Risk Assessment    The following safety risks were identified during admission: fall. Yellow risk band applied: YES.     Skin Initial Assessment    This writer admitted this patient and completed a full skin assessment and Shelton score in the Adult PCS flowsheet. Appropriate interventions initiated as needed.     Shelton Risk Assessment  Sensory Perception: 4-->no impairment  Moisture: 4-->rarely moist  Activity: 3-->walks occasionally  Mobility: 3-->slightly limited  Nutrition: 2-->probably inadequate  Friction and Shear: 3-->no apparent problem  Shelton Score: 19  Nutrition Interventions: Maintain adequate hydration  Mattress: Standard gel/foam mattress (IsoFlex, Atmos Air, etc.)  Bed Frame: Standard width and length        Alexandra Duckworth RN      "

## 2024-04-28 NOTE — PROGRESS NOTES
04/28/24 0151   Valuables   Patient Belongings remains with patient   Patient Belongings Remaining with Patient clothing;vision aids   Did you bring any home meds/supplements to the hospital?  No                    Admission:  I am responsible for any personal items that are not sent to the safe or pharmacy.  [facility] is not responsible for loss, theft or damage of any property in my possession.    Signature:  _________________________________ Date: _______  Time: _____                                              Staff Signature:  ____________________________ Date: ________  Time: _____      2nd Staff person, if patient is unable/unwilling to sign:    Signature: ________________________________ Date: ________  Time: _____     Discharge:  [facility] has returned all of my personal belongings:    Signature: _________________________________ Date: ________  Time: _____                                          Staff Signature:  ____________________________ Date: ________  Time: _____

## 2024-04-28 NOTE — PLAN OF CARE
"Goal Outcome Evaluation:       Patient is easy to rouse and oriented. Speech is slurred and difficult to understand. No other deficits noted. Patient feels he does not need any of the medication prescribed to him, and would rather let \"nature take its course.\" VSS. Bedside swallow eval passed. Continue to monitor.                  "

## 2024-04-28 NOTE — PROGRESS NOTES
Med rec completed with staff at [place], where pt normally resides.     CALCIUM CARBONATE/VITAMIN D3  Take 1 Tab by mouth 3 times daily.        IPRATROPIUM/ALBUTEROL SULFATE (COMBIVENT INHL)   Inhale 2 puffs twice a day and every six hours as needed       MULTIVITAMIN W/IRON           Take 1 Tab by mouth daily.       Fluticasone Propionate 50 mcg/actuation nasal spray, suspension     INSTILL TWO SPRAYS INTO EACH NOSTRIL ONCE DAILY      Latanoprost (XALATAN) 0.005 % ophthalmic solution INSTILL 1 DROP INTO BOTH EYES EVERY EVENING.          Quetiapine Fumarate (SEROQUEL) 300 mg tablet  Take 300 mg by mouth at bedtime      Acetaminophen (TYLENOL) 500 MG table Take 1,000 mg by mouth 2 times PRN     Albuterol Sulfate (PROAIR HFA/PROVENTIL HFA/VENTOLIN HFA) 108 (90 Base) MCG/ACT inhaler  Inhale 2 puffs into the lungs 2 times daily       Alendronate Sodium(FOSAMAX) 70 MG tablet  Take 70 mg by mouth every Friday     Aspirin 81 MG tablet         Take 81 mg by mouth daily      Atorvastatin Calcium (LIPITOR) 40 MG tablet     Take 40 mg by mouth daily     chlorpromazine (THORAZINE) 25 MG tablet  Take 25 mg by mouth At Bedtime     Clopidogrel Bisulfate  (PLAVIX) 75 MG tablet       Take 75 mg by mouth every morning     Fluticasone-Salmeterol (ADVAIR) 250-50 MCG/ACT inhaler  Inhale 1 puff into the lungs 2 times daily      ibuprofen  600 MG tablet   Take 1 tablet by mouth 3 times daily PRN

## 2024-04-28 NOTE — PROGRESS NOTES
04/28/24 3554   Appointment Info   Signing Clinician's Name / Credentials (PT) Dilshad Guerreroarslanmichael MPT   Living Environment   People in Home facility resident   Current Living Arrangements apartment   Home Accessibility no concerns   Transportation Anticipated family or friend will provide   Self-Care   Usual Activity Tolerance moderate   Current Activity Tolerance fair   Equipment Currently Used at Home none   Fall history within last six months yes   Number of times patient has fallen within last six months   (multiple)   General Information   Referring Physician Jacob   Patient/Family Therapy Goals Statement (PT) return home   Existing Precautions/Restrictions fall   Weight-Bearing Status - LLE full weight-bearing   Weight-Bearing Status - RLE full weight-bearing   Cognition   Orientation Status (Cognition) oriented x 3   Follows Commands (Cognition) WFL   Safety Deficit (Cognition) impulsivity;severe deficit   Pain Assessment   Patient Currently in Pain No   Posture    Posture Not impaired   Range of Motion (ROM)   Range of Motion ROM is WFL   Strength (Manual Muscle Testing)   Strength (Manual Muscle Testing) strength is WFL   Bed Mobility   Comment, (Bed Mobility) SBA   Transfers   Comment, (Transfers) CGA to SBA   Gait/Stairs (Locomotion)   Mica Level (Gait) contact guard   Assistive Device (Gait)   (no assistive gait device)   Distance in Feet (Gait) 200   Pattern (Gait) step-through   Balance   Balance Comments fair without use of AD   Sensory Examination   Sensory Perception WFL   Coordination   Coordination no deficits were identified   Muscle Tone   Muscle Tone no deficits were identified   Clinical Impression   Criteria for Skilled Therapeutic Intervention Yes, treatment indicated   PT Diagnosis (PT) impaired mobility   Influenced by the following impairments fatigue   Functional limitations due to impairments activity/gait tolerance   Clinical Presentation (PT Evaluation Complexity) stable    Clinical Decision Making (Complexity) low complexity   Planned Therapy Interventions (PT) gait training   Risk & Benefits of therapy have been explained evaluation/treatment results reviewed;risks/benefits reviewed;patient   PT Total Evaluation Time   PT Eval, Low Complexity Minutes (18781) 15   Physical Therapy Goals   PT Frequency One time eval and treatment only   PT Predicted Duration/Target Date for Goal Attainment 05/01/24   PT Goals Transfers;Gait   PT: Transfers Supervision/stand-by assist   PT: Gait Supervision/stand-by assist;Greater than 200 feet   PT Discharge Planning   PT Plan Continue PT   PT Discharge Recommendation (DC Rec) home with assist   PT Rationale for DC Rec patient appears near/at baseline for mobility   PT Brief overview of current status CGA to SBA  for all mobilities   PT Equipment Needed at Discharge   (no AD)   Total Session Time   Total Session Time (sum of timed and untimed services) 15

## 2024-04-28 NOTE — PROGRESS NOTES
"Jayy Cole is a 74 year old male who presented to the ED from his care facility after he had a fall. After that he had slurred speech and was not intelligible. He had been given a second dose of Seroquel for a total dose of 600mg and he states that he felt dizzy after that and then fell. He notes that he had a headache prior to the fall, and that he gets headaches fairly often. After he got up from his fall, he was noted to have slurred speech and he states that he feels like his left side feels \"big\" and his right side feels \"small\".     Slurred speech  Fall  He had a fall and since then has had some dysarthria. Imaging shows no acute process. Neurology was consulted and recommended an MRI and stroke treatment for now. It is possible that the extra seroquel is the culprit for his dizziness and somnolence and that his dysarthria might be due to a concussion after he hit his head.   - admit to hospitalist service  - neurology consulted  - c/w telemetry  - c/w aspirin and statin  - MRI brain  - TTE  - check A1c and lipid panel  - PT/OT/SLOP  - neuro checks     CAD  Asymptomatic  - c/w aspirin and clopidogrel  - c/w atorvastatin     History of SVT  No SVT noted here.      COPD  - c/w albuterol as needed  - c/w advair     Dementia  - Hold quetiapine      Patient passed swallowing eval and was started on cardiac diet  Awaiting MRI scan of the brain  "

## 2024-04-28 NOTE — PROGRESS NOTES
"Pt refused morning inhaler. Pt stated that he was having a hard time breathing but still continued to refuse inhaler or different breathing tx when offered. Pt stated, \"I have 3 inhalers at home that I never use. I don't want them. I just need to quit smoking and ill be fine.\"    Cat Castro, RT    "

## 2024-04-28 NOTE — PROGRESS NOTES
Writer spoke with Uma Maharaj LPN, with medication administration questions. Per the LPN he was given 2 tabs of 300mg Seroquel tonight around 2000 which he did not receive the night prior. Patient states that he does not believe he takes Seroquel at home.

## 2024-04-28 NOTE — ED TRIAGE NOTES
EMS alerted MD stroke alert, Last Known well 20:30, coming from [facility], c/o dizziness/fall/slurred speech/blurred vision/HA. Hx of stroke BP 99/65, , 91% on RA, pin point pupils. Tier 1 stroke paged prior to arrival @4844

## 2024-04-28 NOTE — PROGRESS NOTES
04/28/24 1300   Appointment Info   Signing Clinician's Name / Credentials (OT) Puja Bassett OTR/L   Living Environment   Current Living Arrangements residential facility   Living Environment Comments Lives at [facility]  in [place]. Came from [facility]   Self-Care   Usual Activity Tolerance good   Current Activity Tolerance moderate   Equipment Currently Used at Home none   Fall history within last six months yes   Number of times patient has fallen within last six months 6   General Information   Left Upper Extremity (Weight-bearing Status) full weight-bearing (FWB)   Right Upper Extremity (Weight-bearing Status) full weight-bearing (FWB)   Left Lower Extremity (Weight-bearing Status) full weight-bearing (FWB)   Right Lower Extremity (Weight-bearing Status) full weight-bearing (FWB)   Cognitive Status Examination   Orientation Status person;place   Behavioral Issues disinhibition   Affect/Mental Status (Cognitive) confabulatory   Follows Commands follows two-step commands   Safety Deficit impulsivity   Attention Deficit requires cues/redirection to task   Executive Function Deficit insight/awareness of deficits;moderate deficit   Cognitive Status Comments Pt confabulates and has mild paranoia at [facility].   Visual Perception   Visual Impairment/Limitations WFL   Range of Motion Comprehensive   General Range of Motion no range of motion deficits identified   Strength Comprehensive (MMT)   General Manual Muscle Testing (MMT) Assessment no strength deficits identified   Coordination   Upper Extremity Coordination No deficits were identified   Bed Mobility   Bed Mobility No deficits identified   Transfers   Transfers No deficits identified   Clinical Impression   Criteria for Skilled Therapeutic Interventions Met (OT) Yes, treatment indicated   OT Diagnosis imparied funtional mobilityand self cares   Influenced by the following impairments fatigue, dizziness,   OT Problem List-Impairments impacting  ADL activity tolerance impaired   Assessment of Occupational Performance 1-3 Performance Deficits   Identified Performance Deficits tioletting   Planned Therapy Interventions (OT) ADL retraining;progressive activity/exercise   Clinical Decision Making Complexity (OT) problem focused assessment/low complexity   Risk & Benefits of therapy have been explained evaluation/treatment results reviewed;care plan/treatment goals reviewed   OT Total Evaluation Time   OT Eval, Low Complexity Minutes (65399) 15   OT Goals   Therapy Frequency (OT) Daily   OT Goals Toilet Transfer/Toileting;Hygiene/Grooming;Upper Body Bathing   OT: Hygiene/Grooming minimal assist   OT: Upper Body Bathing Minimal assist   OT: Toilet Transfer/Toileting Minimal assist   Self-Care/Home Management   Self-Care/Home Mgmt/ADL, Compensatory, Meal Prep Minutes (10418) 25   Symptoms Noted During/After Treatment (Meal Preparation/Planning Training) none   Treatment Detail/Skilled Intervention tp ambulated in hallway with CGA. tiolet in standing wiht CGA   Addison Level (Toilet Training) contact guard   Assistance (Toilet Training) 1 person assist   OT Discharge Planning   OT Plan return to A living, versus [place]   OT Discharge Recommendation (DC Rec) home with assist   OT Rationale for DC Rec [facility] will not have any OT needs at discharge   OT Brief overview of current status Pt is able to perform functional mobility for self cares. cues and supervision at [facility] for self cares.   Total Session Time   Timed Code Treatment Minutes 25   Total Session Time (sum of timed and untimed services) 40   Psychosocial Support   Trust Relationship/Rapport care explained;choices provided;emotional support provided;reassurance provided

## 2024-04-28 NOTE — CONSULTS
[facility]     Stroke Code Note          History of Present Illness     Chief Complaint: Slurred Speech, Dizziness, Fall, and Stroke Symptoms      Jayy Cole is a 74 year old man with h/o CAD, h/o stroke (unknown location), bladder cancer, alcohol and tobacco abuse, who is currently admitted to a [facility] facility, at around 8:30 PM felt dizzy and fell (unclear if there was head trauma). Since then he has had slurred speech that is unintelligible. At ED he does not seem to have any focal motor deficits and follows all commands but on answering questions the speech is severely dysarthric/unintelligible. GCS of 14.         Past Medical History     Stroke risk factors: CAD, h/o stroke (unknown location) and tobacco abuse    Preadmission antithrombotic regimen: Aspirin 81 mg    Modified Nassau Score (Pre-morbid)  Unknown                    Assessment and Plan       1.  Dysarthria - moderate  2. Vertigo  3. Mild somnolence     Intravenous Thrombolysis  Not given due to:   - minor/isolated/quickly resolving symptoms     Endovascular Treatment  Not initiated due to absence of proximal vessel occlusion    D/D: encephalopathy (metabolic/infectious/medication related) vs small stroke (no focal deficits noted and only slurring of speech therefore lower on the differential but can only be definitively ruled out with a MRI)     Plan:  - Place Neurology IP Stroke Consult order   - Neurochecks and Vital Signs every 4 hours   - Permissive HTN; goal SBP < 220 mmHg  - Daily aspirin 325 mg for secondary stroke prevention  - Statin: per lipid profile  - MRI Brain with and without contrast    - TTE (with Bubble Study if age 60 yrs or less)  - Telemetry, EKG  - Bedside Glucose Monitoring  - A1c, Lipid Panel, Troponin x 3  - PT/OT/SLP  - Euthermia, Euglycemia     ___________________________________________________________________    The Stroke Staff is Dr. Guillen.    Alisson Pichardo MD  Vascular  "Neurology Fellow    To page me or covering stroke neurology team member, click here: AMCOM  Choose \"On Call\" tab at top, then select \"NEUROLOGY/ALL SITES\" from middle drop-down box, press Enter, then look for \"stroke\" or \"telestroke\" for your site.  ___________________________________________________________________        Imaging/Labs   (personally reviewed)    CT head: no acute ischemia/hemorrhage  CTA head/neck: no LVO or critical stenoses           Physical Examination                                      Wt Readings from Last 2 Encounters:   09/16/21 56.6 kg (124 lb 12.8 oz)   08/05/20 59.4 kg (131 lb)       See NIHSS        Stroke Scales       NIHSS  1a. Level of Consciousness 0-->Alert, keenly responsive   1b. LOC Questions 0-->Answers both questions correctly   1c. LOC Commands 0-->Performs both tasks correctly   2.   Best Gaze 0-->Normal   3.   Visual 0-->No visual loss   4.   Facial Palsy 0-->Normal symmetrical movements   5a. Motor Arm, Left 0-->No drift, limb holds 90 (or 45) degrees for full 10 secs   5b. Motor Arm, Right 0-->No drift, limb holds 90 (or 45) degrees for full 10 secs   6a. Motor Leg, Left 0-->No drift, leg holds 30 degree position for full 5 secs   6b. Motor Leg, right 0-->No drift, leg holds 30 degree position for full 5 secs   7.   Limb Ataxia 0-->Absent   8.   Sensory 0-->Normal, no sensory loss (said examiner's hand felt big on touching the left leg)   9.   Best Language 0-->No aphasia, normal   10. Dysarthria 1-->Mild-to-moderate dysarthria, patient slurs at least some words and, at worst, can be understood with some difficulty (moderate dysarthria)   11. Extinction and Inattention  0-->No abnormality   Total 1 (04/27/24 2200)            Labs     CBC  No results found for: \"HGB\", \"HCT\", \"WBC\", \"PLT\"    BMP  No results found for: \"NA\", \"POTASSIUM\", \"CHLORIDE\", \"CO2\", \"BUN\", \"CR\", \"GLC\", \"MARIFER\"    INR  No results found for: \"INR\"    Data   Stroke Code Data  (for stroke code with " tele)  Stroke code activated 04/27/24 2139   First stroke provider response 04/27/24 2140   Video start time 04/27/24 2154   Video end time 04/27/24 2215   Last known normal 04/27/24 2030   Time of discovery (or onset of symptoms)  04/27/24 2030   Head CT read by Stroke Neuro Provider 04/27/24 2142   Was stroke code de-escalated? Yes  04/27/24 2215     Telestroke Service Details  Type of service telemedicine diagnostic assessment of acute neurological changes   Reason telemedicine is appropriate patient requires assessment with a specialist for diagnosis and treatment of neurological symptoms   Mode of transmission secure interactive audio and video communication per Addie   Originating site (patient location) [facility]    Distant site (provider location) Provider remote site        Clinically Significant Risk Factors Present on Admission                # Drug Induced Platelet Defect: home medication list includes an antiplatelet medication                     Time Spent on this Encounter

## 2024-04-28 NOTE — H&P
[facility]    History and Physical - Hospitalist Service       Date of Admission:  4/27/2024    Assessment & Plan      Jayy Cole is a 74 year old male admitted on 4/27/2024. He presented to the ED with stroke-like symptoms.     Slurred speech  Fall  He had a fall and since then has had some dysarthria. Imaging shows no acute process. Neurology was consulted and recommended an MRI and stroke treatment for now. It is possible that the extra seroquel is the culprit for his dizziness and somnolence and that his dysarthria might be due to a concussion after he hit his head.   - admit to hospitalist service  - neurology consulted  - c/w telemetry  - c/w aspirin and statin  - MRI brain  - TTE  - check A1c and lipid panel  - PT/OT/SLOP  - neuro checks    CAD  Asymptomatic  - c/w aspirin and clopidogrel  - c/w atorvastatin    History of SVT  No SVT noted here.     COPD  - c/w albuterol as needed  - c/w advair    Dementia  - Hold quetiapine       Diet: NPO for Medical/Clinical Reasons Except for: Other; Specify: Until swallow evaluation has been done (no oral medications).    DVT Prophylaxis: Enoxaparin (Lovenox) SQ  Bullock Catheter: Not present  Lines: None     Code Status: Full Code      Clinically Significant Risk Factors Present on Admission        # Hypokalemia: Lowest K = 3.2 mmol/L in last 2 days, will replace as needed   # Hypocalcemia: Lowest Ca = 8 mg/dL in last 2 days, will monitor and replace as appropriate     # Hypoalbuminemia: Lowest albumin = 3.2 g/dL at 4/27/2024  9:58 PM, will monitor as appropriate   # Drug Induced Platelet Defect: home medication list includes an antiplatelet medication                   Disposition Plan      Expected Discharge Date: 04/29/2024                The patient's care was discussed with the Bedside Nurse and Patient.        Wilver Lira MD  [facility]  Securely message with the Vocera Web Console (learn more here)  Text page via  "[employer] Paging/Directory      Visit/Communication Style   Virtual (Video) communication was used to evaluate Jayy.  Jayy consented to the use of video communication: yes  Video START time: 0415, 4/28/2024  Video STOP time: 0430, 4/28/2024   Patient's location: [facility]   Provider's location during the visit: [facility] Tele-medicine site        ______________________________________________________________________    Chief Complaint   Slurred speech    History is obtained from the patient    History of Present Illness   Jayy Cole is a 74 year old male who presented to the ED from his care facility after he had a fall. After that he had slurred speech and was not intelligible. He had been given a second dose of Seroquel for a total dose of 600mg and he states that he felt dizzy after that and then fell. He notes that he had a headache prior to the fall, and that he gets headaches fairly often. After he got up from his fall, he was noted to have slurred speech and he states that he feels like his left side feels \"big\" and his right side feels \"small\".     Review of Systems    General: negative for fever, chills, sweats, weakness  Eyes: negative for blurred vision, loss of vision  Ear Nose and Throat: negative for pharyngitis, speech or swallowing difficulties  Respiratory:  negative for sputum production, wheezing, POWERS, pleuritic pain, sob or cough  Cardiology:  negative for chest pain, palpitations, orthopnea, PND, edema, syncope   Gastrointestinal: negative for abdominal pain, nausea, vomiting, diarrhea, constipation, hematemesis, melena or hematochezia  Genitourinary: negative for frequency, urgency, dysuria, hematuria   Neurological: Positive for dysarthria    Past Medical History    I have reviewed this patient's medical history and updated it with pertinent information if needed.   No past medical history on file.    Past Surgical History   I have reviewed this patient's " surgical history and updated it with pertinent information if needed.  Past Surgical History:   Procedure Laterality Date    COLONOSCOPY      2006    CYSTOSCOPY      05/12,surveillance    OTHER SURGICAL HISTORY      2008,,HERNIA REPAIR,incarcerated    OTHER SURGICAL HISTORY      2011,[id number],OTHER       Social History   I have reviewed this patient's social history and updated it with pertinent information if needed.  Social History     Tobacco Use    Smoking status: Every Day     Current packs/day: 4.00     Average packs/day: 4.0 packs/day for 56.0 years (224.0 ttl pk-yrs)     Types: Cigarettes    Smokeless tobacco: Never   Substance Use Topics    Alcohol use: No    Drug use: Unknown     Types: Other     Comment: Drug use: Not Asked       Family History   I have reviewed this patient's family history and updated it with pertinent information if needed.  Family History   Problem Relation Age of Onset    Cancer Mother         Cancer,Stomach cancer    Heart Disease Father         Heart Disease       Prior to Admission Medications   Prior to Admission Medications   Prescriptions Last Dose Informant Patient Reported? Taking?   QUEtiapine (SEROQUEL) 300 MG tablet   Yes Yes   Sig: Take 300 mg by mouth at bedtime   acetaminophen (TYLENOL) 500 MG tablet   Yes No   Sig: Take 1,000 mg by mouth 2 times daily as needed   albuterol (PROAIR HFA/PROVENTIL HFA/VENTOLIN HFA) 108 (90 Base) MCG/ACT inhaler   Yes No   Sig: Inhale 2 puffs into the lungs 2 times daily   alendronate (FOSAMAX) 70 MG tablet   Yes No   Sig: Take 70 mg by mouth every 7 days   aspirin 81 MG tablet   Yes No   Sig: Take 81 mg by mouth daily with food   atorvastatin (LIPITOR) 40 MG tablet   Yes No   Sig: Take 40 mg by mouth daily   chlorproMAZINE (THORAZINE) 25 MG tablet   Yes No   Sig: Take 25 mg by mouth At Bedtime   clopidogrel (PLAVIX) 75 MG tablet   Yes No   Sig: Take 75 mg by mouth every morning   fluPHENAZine (PROLIXIN) 5 MG tablet   Yes No   Sig: Take  5 mg by mouth 2 times daily   fluticasone-salmeterol (ADVAIR) 100-50 MCG/DOSE inhaler   Yes No   Sig: Inhale 1 puff into the lungs every 12 hours   fluticasone-salmeterol (ADVAIR) 250-50 MCG/ACT inhaler   Yes Yes   Sig: Inhale 1 puff into the lungs 2 times daily   ibuprofen (ADVIL/MOTRIN) 600 MG tablet   Yes Yes   Sig: Take 1 tablet by mouth 3 times daily   mirtazapine (REMERON) 15 MG tablet   Yes No   Sig: Take 15 mg by mouth At Bedtime      Facility-Administered Medications: None     Allergies   No Known Allergies    Physical Exam   Vital Signs: Temp: 97.8  F (36.6  C) Temp src: Oral BP: 132/78 Pulse: 65   Resp: 16 SpO2: 96 % O2 Device: None (Room air)    Weight: 128 lbs 12.8 oz    Gen:  Well-developed, well-nourished, in no acute distress, lying semi-supine in hospital stretcher  HEENT:  Anicteric sclera, PER, hearing intact to voice  Resp:  No accessory muscle use, breath sounds clear; no wheezes no rales no rhonchi  Card:  No murmur, normal S1, S2   Abd:  Soft per RN exam, no TTP, non-distended, normoactive bowel sounds are present  Musc:  Normal strength and movement of the major muscle groups without obvious deformity  Psych:  Good insight, oriented to person, place and time, not anxious, not agitated  Neuro: + dysarthria, no focal weakness, no dysmetria     Data     Recent Labs   Lab 04/27/24  2158   WBC 8.4   HGB 11.1*   MCV 90      INR 1.09   *   POTASSIUM 3.2*   CHLORIDE 100   CO2 25   BUN 30.7*   CR 0.91   ANIONGAP 9   MARIFER 8.0*   *   ALBUMIN 3.2*   PROTTOTAL 5.5*   BILITOTAL <0.2   ALKPHOS 54   ALT 14   AST 13         Recent Results (from the past 24 hour(s))   CT Head w/o Contrast    Addendum: 4/27/2024    Addendum created by Fran Orozco MD on 4/27/2024 10:05:08 PM CDT:  THIS REPORT CONTAINS FINDINGS THAT MAY BE CRITICAL TO PATIENT CARE. The   findings and impression 1 were verbally communicated via telephone conference   with Dr. Ryan at 10:04 PM CDT on 4/27/2024. The findings were  acknowledged   and understood.          Narrative    Initial report created on 4/27/2024 9:55:40 PM CDT:    PROCEDURE INFORMATION:   Exam: CT Head Without Contrast   Exam date and time: 4/27/2024 9:38 PM   Age: 74 years old   Clinical indication: Stroke-like symptoms; Other: 74-year-old status post fall   with complaints of dizziness. Has history of stroke. Rule out intracranial   bleed. Last known well 8.30pm     TECHNIQUE:   Imaging protocol: Computed tomography of the head without contrast.   Radiation optimization: All CT scans at this facility use at least one of these   dose optimization techniques: automated exposure control; mA and/or kV   adjustment per patient size (includes targeted exams where dose is matched to   clinical indication); or iterative reconstruction.   Other technique: STROKE PROTOCOL was implemented.     COMPARISON:   No prior studies available.     FINDINGS:   Brain: The brain demonstrates mild sulcal prominence, and otherwise normal   morphology. Grey-white matter differentiation is preserved. No acute   intracranial hemorrhage. Scattered subcortical and confluent periventricular   white matter hypodensities. No positive mass effect.   Cerebral ventricles: Mild ventricular prominence.   Paranasal sinuses: Visualized paranasal sinuses are well aerated and clear. No   fluid levels.   Mastoid air cells: Visualized mastoid air cells are well aerated.   Bones/joints: No acute displaced calvarial fracture.   Soft tissues: Within normal limits.       Impression    IMPRESSION:   1.   No acute intracranial abnormality on this noncontrast examination.   Specifically, no intracranial hemorrhage.   2.   Moderate cerebral atrophy and scattered nonspecific white matter change.   3.   CTA head pending at time of interpretation. Please see follow-up CTs for   further details.     ASSESSMENT:   ASPECTS ([place] Stroke Program Early CT Score) is 10 bilaterally.     THIS DOCUMENT HAS BEEN ELECTRONICALLY  SIGNED BY FRAN OROZCO MD   CTA Head Neck with Contrast    Addendum: 4/27/2024    Addendum created by Fran Orozco MD on 4/27/2024 10:31:36 PM CDT:  Findings and CTA neck impression 3 were discussed with Dr. Ryan at 4/27/2024   10:30 PM CDT.          Narrative    Initial report created on 4/27/2024 10:19:59 PM CDT:    PROCEDURE INFORMATION:   Exam: CTA Head With Contrast, Arteriography   Exam date and time: 4/27/2024 9:43 PM   Age: 74 years old   Clinical indication: Stroke-like symptoms; Other: 74 year old with complaints   of dizzyness S/P fall. Last known well 8.30 pm. R/O stroke     TECHNIQUE:   Imaging protocol: Computed tomographic angiography of the head with contrast.   Exam focused on the arteries.   3D rendering (Not supervised by radiologist): MIP and/or 3D reconstructed   images were created by the technologist.   Radiation optimization: All CT scans at this facility use at least one of these   dose optimization techniques: automated exposure control; mA and/or kV   adjustment per patient size (includes targeted exams where dose is matched to   clinical indication); or iterative reconstruction.   Contrast material: ISOVUE 370; Contrast volume: 75 ml; Contrast route:   INTRAVENOUS (IV);      COMPARISON:   CT HEAD W/O CONTRAST 4/27/2024 9:38 PM     FINDINGS:     ANTERIOR CIRCULATION:   Right internal carotid artery: Intracranial segment is patent with no   significant stenosis. No aneurysm.   Right middle cerebral artery: No occlusion or significant stenosis. No   aneurysm.    Right anterior cerebral artery: No occlusion or significant stenosis. No   aneurysm.      Left internal carotid artery: Scattered calcified atherosclerosis of the   parasellar and supraclinoid segments. Intracranial segment is patent with no   significant stenosis. No aneurysm.   Left middle cerebral artery: No occlusion or significant stenosis. No aneurysm.      Left anterior cerebral artery: No occlusion or significant stenosis. No    aneurysm.      POSTERIOR CIRCULATION:   Right vertebral artery: No occlusion or significant stenosis. No aneurysm.    Left vertebral artery: No occlusion or significant stenosis. No aneurysm.    Basilar artery: No occlusion or significant stenosis. No aneurysm.   Right posterior cerebral artery: No occlusion or significant stenosis. No   aneurysm.    Left posterior cerebral artery: No occlusion or significant stenosis. No   aneurysm.      Brain: No definite mass, mass effect, or midline shift. Please see recent prior   dedicated head CT for further details.   Cerebral ventricles: Mild ventricular prominence.   Bones/joints: Unremarkable. No acute fracture.   Soft tissues: Unremarkable.       Impression    IMPRESSION:   No intracranial arterial large vessel stenosis or occlusion.       =========================  PROCEDURE INFORMATION:   Exam: CTA Neck With Contrast   Exam date and time: 4/27/2024 9:43 PM   Age: 74 years old   Clinical indication: Stroke-like symptoms; Other: 74 year old with complaints   of dizzyness S/P fall. Last known well 8.30 pm. R/O stroke     TECHNIQUE:   Imaging protocol: Computed tomographic angiography of the neck with contrast.   Exam focused on the cervical segments of the vasculature.   3D rendering (Not supervised by radiologist): MIP and/or 3D reconstructed   images were created by the technologist.   Radiation optimization: All CT scans at this facility use at least one of these   dose optimization techniques: automated exposure control; mA and/or kV   adjustment per patient size (includes targeted exams where dose is matched to   clinical indication); or iterative reconstruction.   Contrast material: ISOVUE 370; Contrast volume: 75 ml; Contrast route:   INTRAVENOUS (IV);      COMPARISON:   CT HEAD W/O CONTRAST 4/27/2024 9:38 PM     FINDINGS:   Right common carotid artery: Mild calcified and noncalcified atherosclerosis of   the bifurcation. No stenosis. No dissection or occlusion.    Right internal carotid artery: No stenosis of the extracranial segment. No   dissection or occlusion.   Right external carotid artery: No occlusion or stenosis of the origin.      Left common carotid artery: No stenosis. No dissection or occlusion.   Left internal carotid artery: Severe luminal narrowing measuring up to 70%   luminal narrowing of the proximal internal carotid artery secondary to   calcified and noncalcified atherosclerosis for a length of approximate 1.5 cm.   No dissection or occlusion.   Left external carotid artery: No occlusion or stenosis of the origin.      Right vertebral artery: No stenosis. No dissection or occlusion.   Left vertebral artery: Dominant. No stenosis. No dissection or occlusion.     Soft tissues: Normal. No significant soft tissue swelling.     Bones/joints: Bones are diffusely demineralized. Multilevel osseous   degenerative change. Osseous sclerotic focus within the T2, T5 vertebral bodies   measure up to 3 mm. Superior endplate compression deformity of T3 with   approximate 30% anterior vertebral body height loss.     Lungs: Biapical emphysematous change. Azygous fissure.     IMPRESSION:   1.   Severe luminal narrowing of the left proximal internal carotid artery as   above.   2.   No evidence of 50% or greater stenosis involving the cervical segments of   the right internal carotid arteries by NASCET criteria.   3.   Diffuse osteopenia decreases the sensitivity of this examination. With   this limitation, age-indeterminate T3 vertebral body compression fracture   deformity and correlation with patient history or point tenderness, prior   dedicated imaging if available to assess for stability, otherwise dedicated   imaging including MRI of the region of interest may be helpful for further   evaluation as clinically indicated.   4.   Osseous sclerotic foci as above may represent bone islands, with other   etiologies including osseous metastatic disease not excluded in the  appropriate   clinical scenario. Correlation with patient history, prior imaging if available   to assess for stability, nuclear medicine scintigraphy and/or attention on   follow-up imaging may be helpful for further evaluation as clinically   indicated.   5.   Emphysematous change involving both lungs.   THIS REPORT CONTAINS FINDINGS THAT MAY BE CRITICAL TO PATIENT CARE. The   findings and CTA head impression 1, CTA neck impression 1 were verbally   communicated via telephone conference with Dr. Ryan at 10:05 PM CDT on   4/27/2024. The findings were acknowledged and understood.     COMMENTS:   In the absence of a history or active diagnosis of lung cancer, it is   recommended that this patient with emphysema be evaluated for enrollment in a   low dose CT lung cancer screening program.     REFERENCES:   NASCET CRITERIA. The degree of stenosis in the cervical segment of the internal   carotid artery is based on NASCET criteria. Normal is no stenosis. Mild is less   than 50% stenosis. Moderate is 50-69% stenosis. Severe is 70% to 99% stenosis.   Total occlusion is no detectable patent lumen.     THIS DOCUMENT HAS BEEN ELECTRONICALLY SIGNED BY MATHEW MENDOZA MD

## 2024-04-28 NOTE — PLAN OF CARE
Goal Outcome Evaluation:      Plan of Care Reviewed With: patient    Overall Patient Progress: no change    A/O, occasional forgetfulness. Speech slurred/garbled. States no longer dizzy upon arrival to floor. H/O prior CVA, no deficits noted. Neuros intact besides speech. Pt received 600 mg of Seroquel last night before coming to hospital, prior living facility reports pt is sensitive to medication changes. Remains lethargic. Is able to swallow water without difficulty. Plan to do MRI Monday to r/o CVA.

## 2024-04-28 NOTE — ED PROVIDER NOTES
History     Chief Complaint   Patient presents with    Slurred Speech    Dizziness    Fall    Stroke Symptoms     HPI  Jayy Cole is a 74 year old male who reportedly felt dizzy and fell.  Afterwards developed slurred speech and has had slurred speech since.  Patient has a history of previous strokes.  Last known well time was 8:30 PM.  No direct head trauma noted    Allergies:  No Known Allergies    Problem List:    Patient Active Problem List    Diagnosis Date Noted    Cardiac arrhythmia 01/24/2018     Priority: Medium    COPD (chronic obstructive pulmonary disease) (H) 01/24/2018     Priority: Medium    Schizophrenia (H) 01/24/2018     Priority: Medium    Personal history of malignant neoplasm of bladder 04/18/2014     Priority: Medium        Past Medical History:    No past medical history on file.    Past Surgical History:    Past Surgical History:   Procedure Laterality Date    COLONOSCOPY      2006    CYSTOSCOPY      05/12,surveillance    OTHER SURGICAL HISTORY      2008,,HERNIA REPAIR,incarcerated    OTHER SURGICAL HISTORY      2011,[id number],OTHER       Family History:    Family History   Problem Relation Age of Onset    Cancer Mother         Cancer,Stomach cancer    Heart Disease Father         Heart Disease       Social History:  Marital Status:  Single [1]  Social History     Tobacco Use    Smoking status: Every Day     Current packs/day: 4.00     Average packs/day: 4.0 packs/day for 56.0 years (224.0 ttl pk-yrs)     Types: Cigarettes    Smokeless tobacco: Never   Substance Use Topics    Alcohol use: No    Drug use: Unknown     Types: Other     Comment: Drug use: Not Asked        Medications:    acetaminophen (TYLENOL) 500 MG tablet  albuterol (PROAIR HFA/PROVENTIL HFA/VENTOLIN HFA) 108 (90 Base) MCG/ACT inhaler  alendronate (FOSAMAX) 70 MG tablet  aspirin 81 MG tablet  chlorproMAZINE (THORAZINE) 25 MG tablet  fluPHENAZine (PROLIXIN) 5 MG tablet  fluticasone-salmeterol (ADVAIR) 100-50 MCG/DOSE  inhaler  ibuprofen (ADVIL/MOTRIN) 800 MG tablet  mirtazapine (REMERON) 15 MG tablet          Review of Systems   Unable to perform ROS: Patient nonverbal       Physical Exam          Physical Exam  Constitutional:       General: He is not in acute distress.     Appearance: He is not toxic-appearing.   HENT:      Head: Normocephalic.   Cardiovascular:      Rate and Rhythm: Normal rate.   Pulmonary:      Effort: Pulmonary effort is normal.   Abdominal:      General: Abdomen is flat.      Palpations: Abdomen is soft.   Musculoskeletal:      Cervical back: Normal range of motion and neck supple.      Comments: No tenderness with palpation over T-spine or L-spine   Lymphadenopathy:      Cervical: No cervical adenopathy.   Skin:     General: Skin is warm.      Capillary Refill: Capillary refill takes less than 2 seconds.   Neurological:      General: No focal deficit present.      Mental Status: He is alert and oriented to person, place, and time. He is confused.      GCS: GCS eye subscore is 4. GCS verbal subscore is 4. GCS motor subscore is 6.      Cranial Nerves: No cranial nerve deficit.      Motor: No weakness.   Psychiatric:         Mood and Affect: Mood normal.         Behavior: Behavior normal.         ED Course     ED Course as of 04/28/24 0355   Sat Apr 27, 2024   2143 Stroke neurologist contacted. Spoke with Dr. Pichardo. Await images   2213 Dr. Pichardo on the phone.  Case discussed.  They do not feel that he is a candidate for TNK.  Recommend admission for stroke workup with MRI to be ordered as an inpatient   2246 CTA findings noted.  Patient has absolutely no cervical, thoracic or lumbar spine tenderness.  No bruising noted.   2343 Tele-hospitalist paged     Sun Apr 28, 2024   0014 Spoke with Dr. Lira. He agrees to admit     Procedures             Results for orders placed or performed during the hospital encounter of 04/27/24 (from the past 24 hour(s))   CT Head w/o Contrast    Addendum: 4/27/2024    Addendum  created by Fran Orozco MD on 4/27/2024 10:05:08 PM CDT:  THIS REPORT CONTAINS FINDINGS THAT MAY BE CRITICAL TO PATIENT CARE. The   findings and impression 1 were verbally communicated via telephone conference   with Dr. Ryan at 10:04 PM CDT on 4/27/2024. The findings were acknowledged   and understood.          Narrative    Initial report created on 4/27/2024 9:55:40 PM CDT:    PROCEDURE INFORMATION:   Exam: CT Head Without Contrast   Exam date and time: 4/27/2024 9:38 PM   Age: 74 years old   Clinical indication: Stroke-like symptoms; Other: 74-year-old status post fall   with complaints of dizziness. Has history of stroke. Rule out intracranial   bleed. Last known well 8.30pm     TECHNIQUE:   Imaging protocol: Computed tomography of the head without contrast.   Radiation optimization: All CT scans at this facility use at least one of these   dose optimization techniques: automated exposure control; mA and/or kV   adjustment per patient size (includes targeted exams where dose is matched to   clinical indication); or iterative reconstruction.   Other technique: STROKE PROTOCOL was implemented.     COMPARISON:   No prior studies available.     FINDINGS:   Brain: The brain demonstrates mild sulcal prominence, and otherwise normal   morphology. Grey-white matter differentiation is preserved. No acute   intracranial hemorrhage. Scattered subcortical and confluent periventricular   white matter hypodensities. No positive mass effect.   Cerebral ventricles: Mild ventricular prominence.   Paranasal sinuses: Visualized paranasal sinuses are well aerated and clear. No   fluid levels.   Mastoid air cells: Visualized mastoid air cells are well aerated.   Bones/joints: No acute displaced calvarial fracture.   Soft tissues: Within normal limits.       Impression    IMPRESSION:   1.   No acute intracranial abnormality on this noncontrast examination.   Specifically, no intracranial hemorrhage.   2.   Moderate cerebral atrophy  and scattered nonspecific white matter change.   3.   CTA head pending at time of interpretation. Please see follow-up CTs for   further details.     ASSESSMENT:   ASPECTS ([place] Stroke Program Early CT Score) is 10 bilaterally.     THIS DOCUMENT HAS BEEN ELECTRONICALLY SIGNED BY FRAN OROZCO MD   CTA Head Neck with Contrast    Addendum: 4/27/2024    Addendum created by Fran Orozco MD on 4/27/2024 10:31:36 PM CDT:  Findings and CTA neck impression 3 were discussed with Dr. Ryan at 4/27/2024   10:30 PM CDT.          Narrative    Initial report created on 4/27/2024 10:19:59 PM CDT:    PROCEDURE INFORMATION:   Exam: CTA Head With Contrast, Arteriography   Exam date and time: 4/27/2024 9:43 PM   Age: 74 years old   Clinical indication: Stroke-like symptoms; Other: 74 year old with complaints   of dizzyness S/P fall. Last known well 8.30 pm. R/O stroke     TECHNIQUE:   Imaging protocol: Computed tomographic angiography of the head with contrast.   Exam focused on the arteries.   3D rendering (Not supervised by radiologist): MIP and/or 3D reconstructed   images were created by the technologist.   Radiation optimization: All CT scans at this facility use at least one of these   dose optimization techniques: automated exposure control; mA and/or kV   adjustment per patient size (includes targeted exams where dose is matched to   clinical indication); or iterative reconstruction.   Contrast material: ISOVUE 370; Contrast volume: 75 ml; Contrast route:   INTRAVENOUS (IV);      COMPARISON:   CT HEAD W/O CONTRAST 4/27/2024 9:38 PM     FINDINGS:     ANTERIOR CIRCULATION:   Right internal carotid artery: Intracranial segment is patent with no   significant stenosis. No aneurysm.   Right middle cerebral artery: No occlusion or significant stenosis. No   aneurysm.    Right anterior cerebral artery: No occlusion or significant stenosis. No   aneurysm.      Left internal carotid artery: Scattered calcified atherosclerosis of the    parasellar and supraclinoid segments. Intracranial segment is patent with no   significant stenosis. No aneurysm.   Left middle cerebral artery: No occlusion or significant stenosis. No aneurysm.      Left anterior cerebral artery: No occlusion or significant stenosis. No   aneurysm.      POSTERIOR CIRCULATION:   Right vertebral artery: No occlusion or significant stenosis. No aneurysm.    Left vertebral artery: No occlusion or significant stenosis. No aneurysm.    Basilar artery: No occlusion or significant stenosis. No aneurysm.   Right posterior cerebral artery: No occlusion or significant stenosis. No   aneurysm.    Left posterior cerebral artery: No occlusion or significant stenosis. No   aneurysm.      Brain: No definite mass, mass effect, or midline shift. Please see recent prior   dedicated head CT for further details.   Cerebral ventricles: Mild ventricular prominence.   Bones/joints: Unremarkable. No acute fracture.   Soft tissues: Unremarkable.       Impression    IMPRESSION:   No intracranial arterial large vessel stenosis or occlusion.       =========================  PROCEDURE INFORMATION:   Exam: CTA Neck With Contrast   Exam date and time: 4/27/2024 9:43 PM   Age: 74 years old   Clinical indication: Stroke-like symptoms; Other: 74 year old with complaints   of dizzyness S/P fall. Last known well 8.30 pm. R/O stroke     TECHNIQUE:   Imaging protocol: Computed tomographic angiography of the neck with contrast.   Exam focused on the cervical segments of the vasculature.   3D rendering (Not supervised by radiologist): MIP and/or 3D reconstructed   images were created by the technologist.   Radiation optimization: All CT scans at this facility use at least one of these   dose optimization techniques: automated exposure control; mA and/or kV   adjustment per patient size (includes targeted exams where dose is matched to   clinical indication); or iterative reconstruction.   Contrast material: ISOVUE 370;  Contrast volume: 75 ml; Contrast route:   INTRAVENOUS (IV);      COMPARISON:   CT HEAD W/O CONTRAST 4/27/2024 9:38 PM     FINDINGS:   Right common carotid artery: Mild calcified and noncalcified atherosclerosis of   the bifurcation. No stenosis. No dissection or occlusion.   Right internal carotid artery: No stenosis of the extracranial segment. No   dissection or occlusion.   Right external carotid artery: No occlusion or stenosis of the origin.      Left common carotid artery: No stenosis. No dissection or occlusion.   Left internal carotid artery: Severe luminal narrowing measuring up to 70%   luminal narrowing of the proximal internal carotid artery secondary to   calcified and noncalcified atherosclerosis for a length of approximate 1.5 cm.   No dissection or occlusion.   Left external carotid artery: No occlusion or stenosis of the origin.      Right vertebral artery: No stenosis. No dissection or occlusion.   Left vertebral artery: Dominant. No stenosis. No dissection or occlusion.     Soft tissues: Normal. No significant soft tissue swelling.     Bones/joints: Bones are diffusely demineralized. Multilevel osseous   degenerative change. Osseous sclerotic focus within the T2, T5 vertebral bodies   measure up to 3 mm. Superior endplate compression deformity of T3 with   approximate 30% anterior vertebral body height loss.     Lungs: Biapical emphysematous change. Azygous fissure.     IMPRESSION:   1.   Severe luminal narrowing of the left proximal internal carotid artery as   above.   2.   No evidence of 50% or greater stenosis involving the cervical segments of   the right internal carotid arteries by NASCET criteria.   3.   Diffuse osteopenia decreases the sensitivity of this examination. With   this limitation, age-indeterminate T3 vertebral body compression fracture   deformity and correlation with patient history or point tenderness, prior   dedicated imaging if available to assess for stability, otherwise  dedicated   imaging including MRI of the region of interest may be helpful for further   evaluation as clinically indicated.   4.   Osseous sclerotic foci as above may represent bone islands, with other   etiologies including osseous metastatic disease not excluded in the appropriate   clinical scenario. Correlation with patient history, prior imaging if available   to assess for stability, nuclear medicine scintigraphy and/or attention on   follow-up imaging may be helpful for further evaluation as clinically   indicated.   5.   Emphysematous change involving both lungs.   THIS REPORT CONTAINS FINDINGS THAT MAY BE CRITICAL TO PATIENT CARE. The   findings and CTA head impression 1, CTA neck impression 1 were verbally   communicated via telephone conference with Dr. Ryan at 10:05 PM CDT on   4/27/2024. The findings were acknowledged and understood.     COMMENTS:   In the absence of a history or active diagnosis of lung cancer, it is   recommended that this patient with emphysema be evaluated for enrollment in a   low dose CT lung cancer screening program.     REFERENCES:   NASCET CRITERIA. The degree of stenosis in the cervical segment of the internal   carotid artery is based on NASCET criteria. Normal is no stenosis. Mild is less   than 50% stenosis. Moderate is 50-69% stenosis. Severe is 70% to 99% stenosis.   Total occlusion is no detectable patent lumen.     THIS DOCUMENT HAS BEEN ELECTRONICALLY SIGNED BY MATHEW MENDOZA MD   CBC with platelets differential    Narrative    The following orders were created for panel order CBC with platelets differential.  Procedure                               Abnormality         Status                     ---------                               -----------         ------                     CBC with platelets and d...[[id number]]  Abnormal            Final result                 Please view results for these tests on the individual orders.   Comprehensive metabolic panel   Result  Value Ref Range    Sodium 134 (L) 135 - 145 mmol/L    Potassium 3.2 (L) 3.4 - 5.3 mmol/L    Carbon Dioxide (CO2) 25 22 - 29 mmol/L    Anion Gap 9 7 - 15 mmol/L    Urea Nitrogen 30.7 (H) 8.0 - 23.0 mg/dL    Creatinine 0.91 0.67 - 1.17 mg/dL    GFR Estimate 89 >60 mL/min/1.73m2    Calcium 8.0 (L) 8.8 - 10.2 mg/dL    Chloride 100 98 - 107 mmol/L    Glucose 119 (H) 70 - 99 mg/dL    Alkaline Phosphatase 54 40 - 150 U/L    AST 13 0 - 45 U/L    ALT 14 0 - 70 U/L    Protein Total 5.5 (L) 6.4 - 8.3 g/dL    Albumin 3.2 (L) 3.5 - 5.2 g/dL    Bilirubin Total <0.2 <=1.2 mg/dL   INR   Result Value Ref Range    INR 1.09 0.85 - 1.15   CBC with platelets and differential   Result Value Ref Range    WBC Count 8.4 4.0 - 11.0 10e3/uL    RBC Count 3.74 (L) 4.40 - 5.90 10e6/uL    Hemoglobin 11.1 (L) 13.3 - 17.7 g/dL    Hematocrit 33.6 (L) 40.0 - 53.0 %    MCV 90 78 - 100 fL    MCH 29.7 26.5 - 33.0 pg    MCHC 33.0 31.5 - 36.5 g/dL    RDW 14.4 10.0 - 15.0 %    Platelet Count 254 150 - 450 10e3/uL    % Neutrophils 71 %    % Lymphocytes 15 %    % Monocytes 11 %    % Eosinophils 3 %    % Basophils 1 %    % Immature Granulocytes 1 %    NRBCs per 100 WBC 0 <1 /100    Absolute Neutrophils 6.0 1.6 - 8.3 10e3/uL    Absolute Lymphocytes 1.3 0.8 - 5.3 10e3/uL    Absolute Monocytes 0.9 0.0 - 1.3 10e3/uL    Absolute Eosinophils 0.2 0.0 - 0.7 10e3/uL    Absolute Basophils 0.1 0.0 - 0.2 10e3/uL    Absolute Immature Granulocytes 0.1 <=0.4 10e3/uL    Absolute NRBCs 0.0 10e3/uL   Extra Tube    Narrative    The following orders were created for panel order Extra Tube.  Procedure                               Abnormality         Status                     ---------                               -----------         ------                     Extra Serum Separator Tu...[[id number]]                      Final result               Extra Green Top (Lithium...[[id number]]                      Final result                 Please view results for these tests on the  individual orders.   Extra Serum Separator Tube (SST)   Result Value Ref Range    Hold Specimen JIC    Extra Green Top (Lithium Heparin) ON ICE   Result Value Ref Range    Hold Specimen JIC    Hemoglobin A1c   Result Value Ref Range    Hemoglobin A1C 5.4 4.0 - 6.2 %       Medications - No data to display    Assessments & Plan (with Medical Decision Making)     74-year-old male presented today as a code stroke after patient fell and was found to have slurred speech.  Unknown to EMS who were called, patient had just been given Seroquel prior to onset of symptoms.  Patient arrived with a nonfocal exam but what appeared to be slurred speech.  Head CT as well as CT of the head and neck done and did not show an acute stroke.  Patient examined by Telestroke.  Based on findings they did not recommend any TPA at this time.  However they recommended that patient be admitted for an MRI to rule out an acute stroke and to complete stroke workup.  All communicated to the admitting doctor, Dr. Lira.  He agrees to admit.      New Prescriptions    No medications on file       Final diagnoses:   Slurred speech   Stroke-like symptoms       4/27/2024   [facility] AND [facility]       Mitesh Ryan MD  04/28/24 0537

## 2024-04-29 ENCOUNTER — APPOINTMENT (OUTPATIENT)
Dept: MRI IMAGING | Facility: OTHER | Age: 74
DRG: 093 | End: 2024-04-29
Attending: INTERNAL MEDICINE
Payer: MEDICARE

## 2024-04-29 ENCOUNTER — APPOINTMENT (OUTPATIENT)
Dept: CARDIOLOGY | Facility: OTHER | Age: 74
DRG: 093 | End: 2024-04-29
Attending: INTERNAL MEDICINE
Payer: MEDICARE

## 2024-04-29 VITALS
BODY MASS INDEX: 19.52 KG/M2 | DIASTOLIC BLOOD PRESSURE: 73 MMHG | WEIGHT: 128.8 LBS | OXYGEN SATURATION: 95 % | RESPIRATION RATE: 20 BRPM | TEMPERATURE: 97.8 F | SYSTOLIC BLOOD PRESSURE: 110 MMHG | HEART RATE: 89 BPM | HEIGHT: 68 IN

## 2024-04-29 LAB — LVEF ECHO: NORMAL

## 2024-04-29 PROCEDURE — 255N000002 HC RX 255 OP 636: Mod: JZ | Performed by: INTERNAL MEDICINE

## 2024-04-29 PROCEDURE — 70553 MRI BRAIN STEM W/O & W/DYE: CPT | Mod: MG

## 2024-04-29 PROCEDURE — A9575 INJ GADOTERATE MEGLUMI 0.1ML: HCPCS | Mod: JZ | Performed by: INTERNAL MEDICINE

## 2024-04-29 PROCEDURE — 250N000013 HC RX MED GY IP 250 OP 250 PS 637: Performed by: INTERNAL MEDICINE

## 2024-04-29 PROCEDURE — G1010 CDSM STANSON: HCPCS

## 2024-04-29 PROCEDURE — 93306 TTE W/DOPPLER COMPLETE: CPT | Mod: 26 | Performed by: INTERNAL MEDICINE

## 2024-04-29 PROCEDURE — 258N000003 HC RX IP 258 OP 636: Performed by: INTERNAL MEDICINE

## 2024-04-29 PROCEDURE — 99239 HOSP IP/OBS DSCHRG MGMT >30: CPT | Performed by: INTERNAL MEDICINE

## 2024-04-29 PROCEDURE — 93306 TTE W/DOPPLER COMPLETE: CPT

## 2024-04-29 RX ORDER — GADOTERATE MEGLUMINE 376.9 MG/ML
15 INJECTION INTRAVENOUS ONCE
Status: COMPLETED | OUTPATIENT
Start: 2024-04-29 | End: 2024-04-29

## 2024-04-29 RX ORDER — ACYCLOVIR 200 MG/1
10 CAPSULE ORAL 2 TIMES DAILY PRN
Status: DISCONTINUED | OUTPATIENT
Start: 2024-04-29 | End: 2024-04-29

## 2024-04-29 RX ORDER — FERROUS SULFATE 325(65) MG
325 TABLET, DELAYED RELEASE (ENTERIC COATED) ORAL 3 TIMES DAILY
COMMUNITY
End: 2024-06-07

## 2024-04-29 RX ADMIN — DOCUSATE SODIUM 50 MG AND SENNOSIDES 8.6 MG 1 TABLET: 8.6; 5 TABLET, FILM COATED ORAL at 09:08

## 2024-04-29 RX ADMIN — SODIUM CHLORIDE 800 ML: 9 INJECTION, SOLUTION INTRAVENOUS at 11:18

## 2024-04-29 RX ADMIN — CLOPIDOGREL BISULFATE 75 MG: 75 TABLET, FILM COATED ORAL at 09:08

## 2024-04-29 RX ADMIN — ATORVASTATIN CALCIUM 40 MG: 40 TABLET, FILM COATED ORAL at 09:08

## 2024-04-29 RX ADMIN — GADOTERATE MEGLUMINE 12 ML: 376.9 INJECTION INTRAVENOUS at 08:06

## 2024-04-29 RX ADMIN — ASPIRIN 81 MG: 81 TABLET, COATED ORAL at 09:08

## 2024-04-29 ASSESSMENT — ACTIVITIES OF DAILY LIVING (ADL)
ADLS_ACUITY_SCORE: 42

## 2024-04-29 NOTE — PHARMACY - DISCHARGE MEDICATION RECONCILIATION
Pharmacy:  Discharge Counseling and Medication Reconciliation    Jayy Cole  [address]   [place] [address]  [phone] (home)   74 year old male  PCP: Skyler Velasquez    Allergies: Patient has no known allergies.    Discharge Counseling:    Writer did not meet with patient as their are no changes to medications at discharge. Patient is also not a candidate for questions at this time. Patient is discharging to [facility]     Thank you for the consult.    Brian Man [facility]........April 29, 2024 1:29 PM

## 2024-04-29 NOTE — DISCHARGE SUMMARY
"[facility]    Discharge Summary  Hospitalist    Date of Admission:  4/27/2024  Date of Discharge:  4/29/2024  Discharging Provider: Landon Leyva MD  Date of Service (when I saw the patient): 04/29/24    Discharge Diagnoses   Principal Problem:    Slurred speech    Date Noted: 4/28/2024  Active Problems:    Fall    Date Noted: 4/28/2024    Stroke-like symptoms    Date Noted: 4/28/2024      History of Present Illness   Jayy Cole is an 74 year old male who presented with the above.  Patient was admitted by Dr. Lira and per H&P, \"74 year old male who presented to the ED from his care facility after he had a fall. After that he had slurred speech and was not intelligible. He had been given a second dose of Seroquel for a total dose of 600mg and he states that he felt dizzy after that and then fell. He notes that he had a headache prior to the fall, and that he gets headaches fairly often. After he got up from his fall, he was noted to have slurred speech and he states that he feels like his left side feels \"big\" and his right side feels \"small\".\"     Hospital Course   Jayy Cole was admitted on 4/27/2024.  The following problems were addressed during his hospitalization:    Slurred speech  Fall  Initial dysarthria rapidly resolved, initial CT/CTA without evidence of an acute process.  Neurology consulted and recommended MRI.  No evidence of new ischemic insult on MRI.  TTE completed with results reviewed.  He limits his oral intake due to intermittent urinary incontinence.  Orthostatic blood pressures with lying were 120/70, sitting 102/65 and standing 100/67.  He was given fluid bolus and encouraged to increase his oral intake to demise dehydration contributing to lightheadedness with standing, as this was his most predominant symptom.      He was also discouraged from doubling his Seroquel dose given orthostatic side effects.  He is otherwise independently mobile had no other needs in " regards to ADLs or mobility, no new medication changes and he will follow-up in primary care clinic as needed for routine posthospital follow-up.    If patient is having further antipsychotic medication dose adjustments would benefit from psychiatry referral.      CAD  Asymptomatic, will continue with his optimize medical management with dual antiplatelet therapy and statin.    History of SVT  No SVT on telemetry during his stay     COPD  Chronic and stable throughout his stay, he will continue with albuterol as needed and Advair.     Schizophrenia  He will resume his home quetiapine follow-up with mental health as needed    Landon Leyva MD    Significant Results and Procedures   none    Pending Results   These results will be followed up by pcp  Unresulted Labs Ordered in the Past 30 Days of this Admission       No orders found from 3/28/2024 to 4/28/2024.            Code Status   Full Code       Primary Care Physician   Skyler Velasquez    Physical Exam   Temp: 97.8  F (36.6  C) Temp src: Tympanic BP: 110/73 Pulse: 89   Resp: 20 SpO2: 95 % O2 Device: None (Room air)    Vitals:    04/28/24 0133   Weight: 58.4 kg (128 lb 12.8 oz)     Vital Signs with Ranges  Temp:  [97.4  F (36.3  C)-98.7  F (37.1  C)] 97.8  F (36.6  C)  Pulse:  [] 89  Resp:  [16-20] 20  BP: ()/(66-77) 110/73  SpO2:  [94 %-98 %] 95 %  I/O last 3 completed shifts:  In: 480 [P.O.:480]  Out: 1075 [Urine:1075]    GENERAL: Talkative, walking around the room independently, tracking, appropriate, in no apparent distress.  CARDIOVASCULAR: regular rate and rhythm, no murmurs, rubs, or gallops. Normal S1/S2. No lower extremity edema.   RESPIRATORY: clear to auscultation bilaterally, no wheezes, no crackles.   GI: soft, non-tender, non-distended, normoactive bowel sounds.  MUSCULOSKELETAL: warm and well perfused, 2+ dorsalis pedis pulses bilaterally.    SKIN: no pallor,jaundice, or rashes  Neuro: Awake alert and oriented x 3, grossly  nonfocal    Discharge Disposition   Discharged to home  Condition at discharge: Stable    Consultations This Hospital Stay   SMOKING CESSATION PROGRAM IP CONSULT  PHYSICAL THERAPY ADULT IP CONSULT  SPEECH LANGUAGE PATH ADULT IP CONSULT  OCCUPATIONAL THERAPY ADULT IP CONSULT  SOCIAL WORK IP CONSULT    Time Spent on this Encounter   ILandon MD, personally saw the patient today and spent greater than 30 minutes discharging this patient.    Discharge Orders      Reason for your hospital stay    lightheadedness     Follow-up and recommended labs and tests     5/6/2024 11:20 AM Anna Jasso PA-C     Activity    Your activity upon discharge: activity as tolerated. Make sure to take it slow when you are standing up quickly.     When to contact your care team    Call your primary doctor if you have any of the following: temperature greater than 101,  increased shortness of breath, increased drainage, increased swelling, or increased pain.     Discharge Instructions    - there are no new changes to your medications  - avoid taking extra doses of seroquel before bed as this medication can cause a decrease in your blood pressure when standing up quickly.     Diet    Follow this diet upon discharge: Orders Placed This Encounter      Regular Diet Adult. Make sure you stay hydrated and drink 2L of fluids daily     Discharge Medications   Current Discharge Medication List        CONTINUE these medications which have NOT CHANGED    Details   acetaminophen (TYLENOL) 500 MG tablet Take 1,000 mg by mouth 2 times daily      albuterol (PROAIR HFA/PROVENTIL HFA/VENTOLIN HFA) 108 (90 Base) MCG/ACT inhaler Inhale 2 puffs into the lungs 2 times daily      alendronate (FOSAMAX) 70 MG tablet Take 70 mg by mouth every 7 days - takes on Fridays      aspirin 81 MG tablet Take 81 mg by mouth daily with food      atorvastatin (LIPITOR) 40 MG tablet Take 40 mg by mouth at bedtime      calcium carbonate-vitamin D (CALTRATE) 600-10  MG-MCG per tablet Take 1 tablet by mouth 3 times daily      clopidogrel (PLAVIX) 75 MG tablet Take 75 mg by mouth every morning      ferrous sulfate (FE TABS) 325 (65 Fe) MG EC tablet Take 325 mg by mouth 3 times daily - given every other day      fluticasone (FLONASE) 50 MCG/ACT nasal spray Spray 1 spray into both nostrils daily      fluticasone-salmeterol (ADVAIR) 250-50 MCG/ACT inhaler Inhale 1 puff into the lungs 2 times daily      ibuprofen (ADVIL/MOTRIN) 600 MG tablet Take 1 tablet by mouth 3 times daily as needed      latanoprost (XALATAN) 0.005 % ophthalmic solution Place 1 drop into both eyes daily      multivitamin w/minerals (THERA-VIT-M) tablet Take 1 tablet by mouth daily      QUEtiapine (SEROQUEL) 300 MG tablet Take 300 mg by mouth at bedtime           Allergies   No Known Allergies  Data   Most Recent 3 CBC's:  Recent Labs   Lab Test 04/27/24 2158   WBC 8.4   HGB 11.1*   MCV 90         Most Recent 3 BMP's:  Recent Labs   Lab Test 04/28/24  1613 04/28/24  1459 04/28/24  1109 04/28/24  0547 04/27/24  2158   NA  --   --   --   --  134*   POTASSIUM  --  4.3  --   --  3.2*   CHLORIDE  --   --   --   --  100   CO2  --   --   --   --  25   BUN  --   --   --   --  30.7*   CR  --   --   --   --  0.91   ANIONGAP  --   --   --   --  9   MARIFER  --   --   --   --  8.0*   GLC 95  --  83 88 119*     Most Recent 2 LFT's:  Recent Labs   Lab Test 04/27/24 2158   AST 13   ALT 14   ALKPHOS 54   BILITOTAL <0.2     Most Recent INR's and Anticoagulation Dosing History:  Anticoagulation Dose History          Latest Ref Rng & Units 4/27/2024   Recent Dosing and Labs   INR 0.85 - 1.15 1.09      Most Recent 3 Troponin's:No lab results found.  Most Recent Cholesterol Panel:  Recent Labs   Lab Test 04/27/24 2158   CHOL 155   LDL 92   HDL 56   TRIG 37     Most Recent 6 Bacteria Isolates From Any Culture (See EPIC Reports for Culture Details):No lab results found.  Most Recent TSH, T4 and A1c Labs:  Recent Labs   Lab Test  04/27/24  2158   A1C 5.4     Results for orders placed or performed during the hospital encounter of 04/27/24   CT Head w/o Contrast    Addendum: 4/27/2024    Addendum created by Fran Orozco MD on 4/27/2024 10:05:08 PM CDT:  THIS REPORT CONTAINS FINDINGS THAT MAY BE CRITICAL TO PATIENT CARE. The   findings and impression 1 were verbally communicated via telephone conference   with Dr. Ryan at 10:04 PM CDT on 4/27/2024. The findings were acknowledged   and understood.          Narrative    Initial report created on 4/27/2024 9:55:40 PM CDT:    PROCEDURE INFORMATION:   Exam: CT Head Without Contrast   Exam date and time: 4/27/2024 9:38 PM   Age: 74 years old   Clinical indication: Stroke-like symptoms; Other: 74-year-old status post fall   with complaints of dizziness. Has history of stroke. Rule out intracranial   bleed. Last known well 8.30pm     TECHNIQUE:   Imaging protocol: Computed tomography of the head without contrast.   Radiation optimization: All CT scans at this facility use at least one of these   dose optimization techniques: automated exposure control; mA and/or kV   adjustment per patient size (includes targeted exams where dose is matched to   clinical indication); or iterative reconstruction.   Other technique: STROKE PROTOCOL was implemented.     COMPARISON:   No prior studies available.     FINDINGS:   Brain: The brain demonstrates mild sulcal prominence, and otherwise normal   morphology. Grey-white matter differentiation is preserved. No acute   intracranial hemorrhage. Scattered subcortical and confluent periventricular   white matter hypodensities. No positive mass effect.   Cerebral ventricles: Mild ventricular prominence.   Paranasal sinuses: Visualized paranasal sinuses are well aerated and clear. No   fluid levels.   Mastoid air cells: Visualized mastoid air cells are well aerated.   Bones/joints: No acute displaced calvarial fracture.   Soft tissues: Within normal limits.       Impression     IMPRESSION:   1.   No acute intracranial abnormality on this noncontrast examination.   Specifically, no intracranial hemorrhage.   2.   Moderate cerebral atrophy and scattered nonspecific white matter change.   3.   CTA head pending at time of interpretation. Please see follow-up CTs for   further details.     ASSESSMENT:   ASPECTS ([place] Stroke Program Early CT Score) is 10 bilaterally.     THIS DOCUMENT HAS BEEN ELECTRONICALLY SIGNED BY FRAN OROZCO MD   CTA Head Neck with Contrast    Addendum: 4/27/2024    Addendum created by Fran Orozco MD on 4/27/2024 10:31:36 PM CDT:  Findings and CTA neck impression 3 were discussed with Dr. Ryan at 4/27/2024   10:30 PM CDT.          Narrative    Initial report created on 4/27/2024 10:19:59 PM CDT:    PROCEDURE INFORMATION:   Exam: CTA Head With Contrast, Arteriography   Exam date and time: 4/27/2024 9:43 PM   Age: 74 years old   Clinical indication: Stroke-like symptoms; Other: 74 year old with complaints   of dizzyness S/P fall. Last known well 8.30 pm. R/O stroke     TECHNIQUE:   Imaging protocol: Computed tomographic angiography of the head with contrast.   Exam focused on the arteries.   3D rendering (Not supervised by radiologist): MIP and/or 3D reconstructed   images were created by the technologist.   Radiation optimization: All CT scans at this facility use at least one of these   dose optimization techniques: automated exposure control; mA and/or kV   adjustment per patient size (includes targeted exams where dose is matched to   clinical indication); or iterative reconstruction.   Contrast material: ISOVUE 370; Contrast volume: 75 ml; Contrast route:   INTRAVENOUS (IV);      COMPARISON:   CT HEAD W/O CONTRAST 4/27/2024 9:38 PM     FINDINGS:     ANTERIOR CIRCULATION:   Right internal carotid artery: Intracranial segment is patent with no   significant stenosis. No aneurysm.   Right middle cerebral artery: No occlusion or significant stenosis. No   aneurysm.     Right anterior cerebral artery: No occlusion or significant stenosis. No   aneurysm.      Left internal carotid artery: Scattered calcified atherosclerosis of the   parasellar and supraclinoid segments. Intracranial segment is patent with no   significant stenosis. No aneurysm.   Left middle cerebral artery: No occlusion or significant stenosis. No aneurysm.      Left anterior cerebral artery: No occlusion or significant stenosis. No   aneurysm.      POSTERIOR CIRCULATION:   Right vertebral artery: No occlusion or significant stenosis. No aneurysm.    Left vertebral artery: No occlusion or significant stenosis. No aneurysm.    Basilar artery: No occlusion or significant stenosis. No aneurysm.   Right posterior cerebral artery: No occlusion or significant stenosis. No   aneurysm.    Left posterior cerebral artery: No occlusion or significant stenosis. No   aneurysm.      Brain: No definite mass, mass effect, or midline shift. Please see recent prior   dedicated head CT for further details.   Cerebral ventricles: Mild ventricular prominence.   Bones/joints: Unremarkable. No acute fracture.   Soft tissues: Unremarkable.       Impression    IMPRESSION:   No intracranial arterial large vessel stenosis or occlusion.       =========================  PROCEDURE INFORMATION:   Exam: CTA Neck With Contrast   Exam date and time: 4/27/2024 9:43 PM   Age: 74 years old   Clinical indication: Stroke-like symptoms; Other: 74 year old with complaints   of dizzyness S/P fall. Last known well 8.30 pm. R/O stroke     TECHNIQUE:   Imaging protocol: Computed tomographic angiography of the neck with contrast.   Exam focused on the cervical segments of the vasculature.   3D rendering (Not supervised by radiologist): MIP and/or 3D reconstructed   images were created by the technologist.   Radiation optimization: All CT scans at this facility use at least one of these   dose optimization techniques: automated exposure control; mA and/or kV    adjustment per patient size (includes targeted exams where dose is matched to   clinical indication); or iterative reconstruction.   Contrast material: ISOVUE 370; Contrast volume: 75 ml; Contrast route:   INTRAVENOUS (IV);      COMPARISON:   CT HEAD W/O CONTRAST 4/27/2024 9:38 PM     FINDINGS:   Right common carotid artery: Mild calcified and noncalcified atherosclerosis of   the bifurcation. No stenosis. No dissection or occlusion.   Right internal carotid artery: No stenosis of the extracranial segment. No   dissection or occlusion.   Right external carotid artery: No occlusion or stenosis of the origin.      Left common carotid artery: No stenosis. No dissection or occlusion.   Left internal carotid artery: Severe luminal narrowing measuring up to 70%   luminal narrowing of the proximal internal carotid artery secondary to   calcified and noncalcified atherosclerosis for a length of approximate 1.5 cm.   No dissection or occlusion.   Left external carotid artery: No occlusion or stenosis of the origin.      Right vertebral artery: No stenosis. No dissection or occlusion.   Left vertebral artery: Dominant. No stenosis. No dissection or occlusion.     Soft tissues: Normal. No significant soft tissue swelling.     Bones/joints: Bones are diffusely demineralized. Multilevel osseous   degenerative change. Osseous sclerotic focus within the T2, T5 vertebral bodies   measure up to 3 mm. Superior endplate compression deformity of T3 with   approximate 30% anterior vertebral body height loss.     Lungs: Biapical emphysematous change. Azygous fissure.     IMPRESSION:   1.   Severe luminal narrowing of the left proximal internal carotid artery as   above.   2.   No evidence of 50% or greater stenosis involving the cervical segments of   the right internal carotid arteries by NASCET criteria.   3.   Diffuse osteopenia decreases the sensitivity of this examination. With   this limitation, age-indeterminate T3 vertebral  body compression fracture   deformity and correlation with patient history or point tenderness, prior   dedicated imaging if available to assess for stability, otherwise dedicated   imaging including MRI of the region of interest may be helpful for further   evaluation as clinically indicated.   4.   Osseous sclerotic foci as above may represent bone islands, with other   etiologies including osseous metastatic disease not excluded in the appropriate   clinical scenario. Correlation with patient history, prior imaging if available   to assess for stability, nuclear medicine scintigraphy and/or attention on   follow-up imaging may be helpful for further evaluation as clinically   indicated.   5.   Emphysematous change involving both lungs.   THIS REPORT CONTAINS FINDINGS THAT MAY BE CRITICAL TO PATIENT CARE. The   findings and CTA head impression 1, CTA neck impression 1 were verbally   communicated via telephone conference with Dr. Ryan at 10:05 PM CDT on   4/27/2024. The findings were acknowledged and understood.     COMMENTS:   In the absence of a history or active diagnosis of lung cancer, it is   recommended that this patient with emphysema be evaluated for enrollment in a   low dose CT lung cancer screening program.     REFERENCES:   NASCET CRITERIA. The degree of stenosis in the cervical segment of the internal   carotid artery is based on NASCET criteria. Normal is no stenosis. Mild is less   than 50% stenosis. Moderate is 50-69% stenosis. Severe is 70% to 99% stenosis.   Total occlusion is no detectable patent lumen.     THIS DOCUMENT HAS BEEN ELECTRONICALLY SIGNED BY MATHEW MENDOZA MD   MR Brain with and without contrast    Narrative    EXAM:  MR BRAIN W/O & W CONTRAST    HISTORY:  Slurred speech, fall. .    TECHNIQUE:  Sagittal T1, axial T1, T2, FLAIR, diffusion, gradient as  well as axial and 3D postcontrast imaging of the whole brain was  performed.    MEDS/CONTRAST: 12 ML DOTAREM    COMPARISON:  CT  2024     FINDINGS:    Prominence of the ventricles and sulci is compatible with moderate,  generalized volume loss. No abnormal extra-axial collection,  hydrocephalus, mass effect or midline shift is seen. The basal  cisterns are preserved.    Mild to moderate chronic microvascular ischemic changes are present.  No abnormal intracranial enhancement or concerning T2* gradient  susceptibility is identified. No restricted diffusion is present. The  major intracranial vascular flow voids are preserved.    The T1 marrow signal is unremarkable. The orbits are intact. Paranasal  sinus mucosal thickening is seen.      Impression    IMPRESSION: No evidence of acute or subacute ischemia, edema or  hydrocephalus.     Moderate generalized volume loss. Mild to moderate chronic  microvascular ischemic change.    CHLOE ROJO MD         SYSTEM ID:  [id number]   Echocardiogram Complete     Value    LVEF  60-65%    Narrative    [id number]  [id number]  [id number]  [id number]^JIL^PORSHA     [facility]  [address] [place] [address]     Name: FREDDY MATA  MRN: [id number]  : 1950  Study Date: 2024 08:11 AM  Age: 74 yrs  Gender: Male  Patient Location: [facility]  Reason For Study: TIA  Ordering Physician: PORSHA LEY  Performed By: ESTHER Carrasco, RDCS, RVT     BSA: 1.7 m2  Height: 68 in  Weight: 128 lb  HR: 84  BP: 123/75 mmHg  ______________________________________________________________________________  Procedure  Complete Portable Echo Adult.  ______________________________________________________________________________  Interpretation Summary  Left ventricular size, wall motion and function are normal. The ejection  fraction is 60-65%.  Right ventricular function, chamber size, wall motion, and thickness are  normal.  IVC diameter and respiratory changes fall into an intermediate range  suggesting an RA pressure of 8 mmHg. No pericardial effusion is present.     There is  no prior study for direct comparison.  ______________________________________________________________________________  Left Ventricle  Left ventricular size, wall motion and function are normal. The ejection  fraction is 60-65%. Left ventricular diastolic function is indeterminate. No  regional wall motion abnormalities are seen.     Right Ventricle  Right ventricular function, chamber size, wall motion, and thickness are  normal.     Atria  Both atria appear normal. The atrial septum is intact as assessed by color  Doppler . An atrial-septal aneurysm is present.     Mitral Valve  The mitral valve is normal.     Aortic Valve  The aortic valve is tricuspid. Trileaflet aortic sclerosis without stenosis.  No aortic regurgitation is present.     Tricuspid Valve  The tricuspid valve is normal. Mild tricuspid insufficiency is present. The  right ventricular systolic pressure is approximated at 28.3 mmHg plus the  right atrial pressure.     Pulmonic Valve  The pulmonic valve is normal.     Vessels  The aorta root is normal. IVC diameter and respiratory changes fall into an  intermediate range suggesting an RA pressure of 8 mmHg.     Pericardium  No pericardial effusion is present.     Compared to Previous Study  There is no prior study for direct comparison.     ______________________________________________________________________________  MMode/2D Measurements & Calculations  IVSd: 0.82 cm  LVIDd: 3.8 cm  LVIDs: 2.7 cm  LVPWd: 0.83 cm  FS: 29.2 %     LV mass(C)d: 89.3 grams  LV mass(C)dI: 52.8 grams/m2  Ao root diam: 3.0 cm  asc Aorta Diam: 2.9 cm  LVOT diam: 2.1 cm  LVOT area: 3.5 cm2  Ao root diam index Ht(cm/m): 1.8  Ao root diam index BSA (cm/m2): 1.8  Asc Ao diam index BSA (cm/m2): 1.7  Asc Ao diam index Ht(cm/m): 1.7  LA Volume (BP): 21.5 ml  LA Volume Index (BP): 12.7 ml/m2     RWT: 0.44  TAPSE: 2.2 cm     Doppler Measurements & Calculations  MV E max madhavi: 42.8 cm/sec  MV A max madhavi: 97.7 cm/sec  MV E/A: 0.44  MV  dec time: 0.18 sec  Ao V2 max: 125.0 cm/sec  Ao max P.0 mmHg  Ao V2 mean: 82.8 cm/sec  Ao mean PG: 3.0 mmHg  Ao V2 VTI: 18.5 cm  MARIO(I,D): 3.1 cm2  MARIO(V,D): 2.8 cm2  LV V1 max P.0 mmHg  LV V1 max: 99.4 cm/sec  LV V1 VTI: 16.4 cm  SV(LVOT): 57.3 ml  SI(LVOT): 33.9 ml/m2  PA acc time: 0.11 sec  TR max vincent: 264.7 cm/sec  TR max P.3 mmHg  AV Vincent Ratio (DI): 0.80  MARIO Index (cm2/m2): 1.8  E/E' av.5  Lateral E/e': 4.7  Medial E/e': 6.3     RV S Vincent: 15.2 cm/sec     ______________________________________________________________________________  Report approved by: Gisela BERGMAN 2024 12:20 PM

## 2024-04-29 NOTE — PROGRESS NOTES
Nurse-nurse called to Liya at [facility]. No additional questions, requested a copy of discharge summary.

## 2024-04-29 NOTE — PROGRESS NOTES
"  [facility]    Stroke Telephone Note    See stroke consult note from Dr. Pichardo on 04/07/2024 for HPI.   Vitals  BP: 110/73   Pulse: 89   Resp: 20   Temp: 97.8  F (36.6  C)   Weight: 58.4 kg (128 lb 12.8 oz)    Imaging Findings  MRI Brain: Negative for infarct.   Echocardiogram: Left ventricular size, wall motion and function are normal. EF 60-65%.  Right ventricular function, chamber size, wall motion, and thickness are normal. Both atria appear normal. The atrial septum is intact as assessed by color Doppler. An atrial-septal aneurysm is present.    Impression  Dysarthria, vertigo, mild somnolence, unclear etiology. Per chart review, dysarthria has improved. D/D: encephalopathy (metabolic/infectious/medication related) vs other. Fortunately, MRI brain was negative for acute infarct.     Recommendations  - See stroke consult note from Dr. Pichardo on 04/07/2024 for detailed recommendations   - Recommend outpatient cardiology evaluation regarding further evaluation of atrial-septal aneurysm   - No further stroke workup is recommended, we will sign off. Please contact us for additional questions or concerns.     Case discussed with vascular neurology attending Dr. Green.    My recommendations are based on the information provided over the phone by Jayy Cole's in-person providers. They are not intended to replace the clinical judgment of his in-person providers. I was not requested to personally see or examine the patient at this time.     Marlena Paez PA-C  Vascular Neurology    To page me or covering stroke neurology team member, click here: AMCOM  Choose \"On Call\" tab at top, then select \"NEUROLOGY/ALL SITES\" from middle drop-down box, press Enter, then look for \"stroke\" or \"telestroke\" for your site.   "

## 2024-04-29 NOTE — PROGRESS NOTES
:    Spoke with protective transport to see if they are able to transport patient back to [facility]. They are looking into it and will update me about whether or not they can transport patient.     CORWIN Armenta on 4/29/2024 at 10:15 AM    Spoke with Liya at [facility] who reported she had talked with protective transport and that [facility] would plan on transporting patient back to their facility. I updated Liya that patient is scheduled to have an ECHO this morning. [facility] will plan on picking patient up today at 1500. Patient was updated about this plan.     CORWIN Armenta on 4/29/2024 at 10:34 AM    Discharge summary faxed to Liya at [facility] ([phone]). Liya asked to speak with patients nurse again as she had a medical question. Updated patients nurse about this.     No further needs from .     CORWIN Armenta on 4/29/2024 at 3:17 PM

## 2024-04-29 NOTE — PLAN OF CARE
VSS and recorded. Alert and oriented. Neuros WNL per baseline. Speech has improved throughout the day. Patient bolus complete and blood pressure and dizziness have improved. Ambulating independently in room. Patient has been singing and pleasant with staff. Good appetite. Voiding regularly.  Meets goals for discharge.

## 2024-04-29 NOTE — PHARMACY-ADMISSION MEDICATION HISTORY
Pharmacist Admission Medication History    Admission medication history is complete. The information provided in this note is only as accurate as the sources available at the time of the update.    Information Source(s): Patient's pharmacy and Facility (TCU/NH/) medication list/MAR via phone Cristy at [facility] in [place], Medication list from [facility], as well as refill history from [facility].Also called [facility] in [place], [place].     Changes made to PTA medication list:  Notes  Nortriptyline: Per Cristy at [facility], patient was not receiving Nortriptyline at their facility (even though it was present on the medication list) Per [facility] refill history nortriptyline had not been filled tgus this was not added to the prior to admission home medication list.   Combivent: This was only listed on the initial nursing note relayed from the first conversation with [facility]. After reviewing the home medication list from [facility] as well as the refill history from [facility] no record was found - this was removed from the home medication list.   Seroquel: Multiple sources list dose as 600 mg (including [facility] - where it is prescribed from) - however [facility] has the dose listed as 300 mg. Currently on the home medication list as 300 mg, it is unclear when this dose was decreased. [facility] was contacted for more information - awaiting response at the time of this note.     Medication History Completed By: Xin Chase [facility] 4/29/2024 10:36 AM    PTA Med List   Medication Sig Last Dose    acetaminophen (TYLENOL) 500 MG tablet Take 1,000 mg by mouth 2 times daily Unknown at na    albuterol (PROAIR HFA/PROVENTIL HFA/VENTOLIN HFA) 108 (90 Base) MCG/ACT inhaler Inhale 2 puffs into the lungs 2 times daily Unknown at na    alendronate (FOSAMAX) 70 MG tablet Take 70 mg by mouth every 7 days - takes on Fridays  Past Week at na    aspirin 81 MG tablet Take 81 mg by mouth daily with food 4/27/2024 at na    atorvastatin (LIPITOR) 40 MG tablet Take 40 mg by mouth at bedtime 4/27/2024 at na    calcium carbonate-vitamin D (CALTRATE) 600-10 MG-MCG per tablet Take 1 tablet by mouth 3 times daily Unknown at na    clopidogrel (PLAVIX) 75 MG tablet Take 75 mg by mouth every morning Unknown at na    fluticasone (FLONASE) 50 MCG/ACT nasal spray Spray 1 spray into both nostrils daily Unknown at na    fluticasone-salmeterol (ADVAIR) 250-50 MCG/ACT inhaler Inhale 1 puff into the lungs 2 times daily Unknown at na    ibuprofen (ADVIL/MOTRIN) 600 MG tablet Take 1 tablet by mouth 3 times daily as needed Unknown at na    latanoprost (XALATAN) 0.005 % ophthalmic solution Place 1 drop into both eyes daily Unknown at na    multivitamin w/minerals (THERA-VIT-M) tablet Take 1 tablet by mouth daily Unknown at na    QUEtiapine (SEROQUEL) 300 MG tablet Take 300 mg by mouth at bedtime Unknown at na

## 2024-04-29 NOTE — PROGRESS NOTES
SAFETY CHECKLIST  ID Bands and Risk clasps correct and in place (DNR, Fall risk, Allergy, Latex, Limb):  Yes  All Lines Reconciled and labeled correctly: Yes  Whiteboard updated:Yes  Environmental interventions: Yes  Verify Tele #:  MS 5

## 2024-04-29 NOTE — PROGRESS NOTES
Chart reviewed per MST screen: pt reported unsure about wt loss and decreased appetite. His weight has been consistent over past 2+years. He usually lives at [place]. He has been refusing to eat/drink. He did pass his swallow eval. Will monitor diet tolerance and intakes. Follow up in 1-5 days.   Diet: regular   Intakes: refusing to eat/drink, just wants to leave   Wt Readings from Last 10 Encounters:   04/28/24 58.4 kg (128 lb 12.8 oz)   09/16/21 56.6 kg (124 lb 12.8 oz)   08/05/20 59.4 kg (131 lb)   07/29/19 60.4 kg (133 lb 3.2 oz)   08/16/18 61 kg (134 lb 6.4 oz)   07/20/17 59.3 kg (130 lb 12.8 oz)   07/27/16 62.8 kg (138 lb 6.4 oz)   04/18/14 63 kg (139 lb)    123# on 12/21/22    Sheree Rowley RD on 4/29/2024 at 9:23 AM

## 2024-04-29 NOTE — PLAN OF CARE
Goal Outcome Evaluation:    Pt up at 0400 and dressed himself to leave facility.  RN at bedside and offered fluids, food, television, etc to patient promote comfort & pt stated he does not want anything, he just wants to leave the hospital.  Pt verbalized understanding that discharge will not occur until the AM and stated he will wait until then, but does not want any medications, blood sugar checks or anything.  Pt removed telemetry and refused to put telemetry back on. Pts frustration has escalated overnight & cares refused throughout evening/night. Refusing to eat or drink as well. RN attempting to de-escalate & keep pt calm. Q 4 hr VS and neuro check not completed at 0530 due to pt refusal.    Liliane Pierre RN on 4/29/2024 at 4:07 AM

## 2024-04-29 NOTE — PLAN OF CARE
VSS & neuro check WNL. Slightly slurred speech at times.  MRI tomorrow AM. Glucose checks orders for 24 hours from admission. RN & CNA attempted to check patients blood sugar at 2130 & pt became very agitated towards RN.  Pt refused 2200 blood sugar check. Pt also refusing night time medications & SCDs despite education on importance of meds & SCDs.   Denies pain. Neuro check WNL except for slightly slurred speech at times.  Denies dizziness. Using urinal to void. Ambulating independently & steady when up to bathroom but bed alarm in use as pt does not call out if he needs assistance. Pt states he will not eat or drink & just wants out of the hospital.  Will continue to monitor.    Liliane Pierre RN on 4/29/2024 at 1:44 AM

## 2024-04-29 NOTE — PROGRESS NOTES
Pt's heart rate up in the 140's for a minute or 2....  upon reading chart, I see pt was very agitated during this time. Did call Medical nursing station.

## 2024-04-30 ENCOUNTER — PATIENT OUTREACH (OUTPATIENT)
Dept: FAMILY MEDICINE | Facility: OTHER | Age: 74
End: 2024-04-30
Payer: MEDICARE

## 2024-04-30 NOTE — PROGRESS NOTES
NSG DISCHARGE NOTE    Patient discharged to [facility] at 7:07 PM via ambulation. Accompanied by staff. Discharge instructions reviewed with patient, opportunity offered to ask questions. Prescriptions sent to patients preferred pharmacy. All belongings sent with patient.    Anish Suazo RN

## 2024-04-30 NOTE — TELEPHONE ENCOUNTER
First Transitional Care Management phone call attempted at 8:07 AM 4/30/2024.   Phone just rings and then to busy tone, no voicemail     Annette Stoddard RN on 4/30/2024 at 8:08 AM

## 2024-05-01 NOTE — TELEPHONE ENCOUNTER
Third Transitional Care Management phone call attempted at 1:27 PM 5/1/2024.  Number no longer in service.  Will close encounter for now.    Annette Stoddard RN on 5/1/2024 at 1:27 PM

## 2024-05-01 NOTE — TELEPHONE ENCOUNTER
Second Transitional Care Management phone call attempted at 8:11 AM 5/1/2024.   Number no longer in service    Annette Stoddard RN on 5/1/2024 at 8:11 AM

## 2024-05-02 PROBLEM — R06.02 SHORTNESS OF BREATH: Status: ACTIVE | Noted: 2022-12-21

## 2024-05-02 PROBLEM — H25.13 NUCLEAR SCLEROTIC CATARACT OF BOTH EYES: Status: ACTIVE | Noted: 2018-09-04

## 2024-05-02 PROBLEM — I42.9 CARDIOMYOPATHY (H): Status: ACTIVE | Noted: 2022-12-21

## 2024-05-02 PROBLEM — I47.10 SUPRAVENTRICULAR TACHYCARDIA (H): Status: ACTIVE | Noted: 2022-12-21

## 2024-05-02 PROBLEM — F10.11 HISTORY OF ALCOHOL ABUSE: Status: ACTIVE | Noted: 2022-12-21

## 2024-05-02 PROBLEM — H40.1131 PRIMARY OPEN ANGLE GLAUCOMA (POAG) OF BOTH EYES, MILD STAGE: Status: ACTIVE | Noted: 2018-09-04

## 2024-05-02 PROBLEM — Z86.73 HISTORY OF STROKE: Status: ACTIVE | Noted: 2022-12-21

## 2024-05-02 PROBLEM — H52.203 ASTIGMATISM OF BOTH EYES WITH PRESBYOPIA: Status: ACTIVE | Noted: 2018-09-04

## 2024-05-02 PROBLEM — H52.4 ASTIGMATISM OF BOTH EYES WITH PRESBYOPIA: Status: ACTIVE | Noted: 2018-09-04

## 2024-05-06 ENCOUNTER — OFFICE VISIT (OUTPATIENT)
Dept: FAMILY MEDICINE | Facility: OTHER | Age: 74
End: 2024-05-06
Attending: PHYSICIAN ASSISTANT
Payer: MEDICARE

## 2024-05-06 VITALS
BODY MASS INDEX: 19.89 KG/M2 | TEMPERATURE: 98.2 F | WEIGHT: 130.8 LBS | HEART RATE: 85 BPM | DIASTOLIC BLOOD PRESSURE: 76 MMHG | SYSTOLIC BLOOD PRESSURE: 130 MMHG | OXYGEN SATURATION: 95 %

## 2024-05-06 DIAGNOSIS — R29.90 STROKE-LIKE SYMPTOMS: ICD-10-CM

## 2024-05-06 DIAGNOSIS — Z85.51 PERSONAL HISTORY OF MALIGNANT NEOPLASM OF BLADDER: ICD-10-CM

## 2024-05-06 DIAGNOSIS — R32 URINARY INCONTINENCE, UNSPECIFIED TYPE: ICD-10-CM

## 2024-05-06 DIAGNOSIS — Z12.5 SCREENING FOR PROSTATE CANCER: ICD-10-CM

## 2024-05-06 DIAGNOSIS — Z09 HOSPITAL DISCHARGE FOLLOW-UP: Primary | ICD-10-CM

## 2024-05-06 LAB
ALBUMIN UR-MCNC: NEGATIVE MG/DL
APPEARANCE UR: CLEAR
BILIRUB UR QL STRIP: NEGATIVE
COLOR UR AUTO: YELLOW
GLUCOSE UR STRIP-MCNC: NEGATIVE MG/DL
HGB UR QL STRIP: NEGATIVE
KETONES UR STRIP-MCNC: NEGATIVE MG/DL
LEUKOCYTE ESTERASE UR QL STRIP: NEGATIVE
NITRATE UR QL: NEGATIVE
PH UR STRIP: 6.5 [PH] (ref 5–9)
PSA SERPL DL<=0.01 NG/ML-MCNC: 0.37 NG/ML (ref 0–6.5)
SP GR UR STRIP: 1 (ref 1–1.03)
UROBILINOGEN UR STRIP-MCNC: NORMAL MG/DL

## 2024-05-06 PROCEDURE — 99496 TRANSJ CARE MGMT HIGH F2F 7D: CPT | Performed by: PHYSICIAN ASSISTANT

## 2024-05-06 PROCEDURE — 81003 URINALYSIS AUTO W/O SCOPE: CPT | Mod: ZL | Performed by: PHYSICIAN ASSISTANT

## 2024-05-06 PROCEDURE — 36415 COLL VENOUS BLD VENIPUNCTURE: CPT | Mod: ZL | Performed by: PHYSICIAN ASSISTANT

## 2024-05-06 PROCEDURE — G0103 PSA SCREENING: HCPCS | Mod: ZL | Performed by: PHYSICIAN ASSISTANT

## 2024-05-06 ASSESSMENT — PATIENT HEALTH QUESTIONNAIRE - PHQ9
SUM OF ALL RESPONSES TO PHQ QUESTIONS 1-9: 13
SUM OF ALL RESPONSES TO PHQ QUESTIONS 1-9: 13
10. IF YOU CHECKED OFF ANY PROBLEMS, HOW DIFFICULT HAVE THESE PROBLEMS MADE IT FOR YOU TO DO YOUR WORK, TAKE CARE OF THINGS AT HOME, OR GET ALONG WITH OTHER PEOPLE: SOMEWHAT DIFFICULT

## 2024-05-06 NOTE — NURSING NOTE
"Chief Complaint   Patient presents with    Hospital F/U     Patient is in Inpatient treatment at [facility].  Initial /76   Pulse 85   Temp 98.2  F (36.8  C) (Tympanic)   Wt 59.3 kg (130 lb 12.8 oz)   SpO2 95%   BMI 19.89 kg/m   Estimated body mass index is 19.89 kg/m  as calculated from the following:    Height as of 4/28/24: 1.727 m (5' 8\").    Weight as of this encounter: 59.3 kg (130 lb 12.8 oz).  Medication Review: complete    The next two questions are to help us understand your food security.  If you are feeling you need any assistance in this area, we have resources available to support you today.          5/6/2024   SDOH- Food Insecurity   Within the past 12 months, did you worry that your food would run out before you got money to buy more? N   Within the past 12 months, did the food you bought just not last and you didn t have money to get more? N         Health Care Directive:  Patient does not have a Health Care Directive or Living Will: Discussed advance care planning with patient; however, patient declined at this time.    Dottie Caba MA      "

## 2024-05-06 NOTE — COMMUNITY RESOURCES LIST (ENGLISH)
May 6, 2024           YOUR PERSONALIZED LIST OF SERVICES & PROGRAMS           & SHELTER    Housing      [facility]  Phone: [phone]  Website: [email or web address].tv/  Language: English, Hmong, Oromo, Micronesian, Yakut  Hours: Mon 9:00 AM - 5:00 PM Tue 9:00 AM - 5:00 PM Wed 9:00 AM - 5:00 PM Thu 9:00 AM - 5:00 PM Fri 9:00 AM - 5:00 PM  Fee: Insurance  Accessibility: Blind accommodation, Deaf or hard of hearing, Translation services  Transportation Options: Free transportation    Case Management      [employer] [facility] [employer] [facility]  Phone: [phone]  Website: https://homebasemn.com/  Language: English  Hours: Mon 8:00 AM - 4:00 PM Tue 8:00 AM - 4:00 PM Wed 8:00 AM - 4:00 PM Thu 8:00 AM - 4:00 PM Fri 8:00 AM - 4:00 PM  Fee: Free  Accessibility: Blind accommodation, Deaf or hard of hearing  Transportation Options: Free transportation    Drop-In Services      [employer]  Website: http://www.laundrylove.org               IMPORTANT NUMBERS & WEBSITES        Emergency Services  911  .   United Way  211 http://211unitedway.org  .   Poison Control  (970) 358-5949 http://[place]poison.org http://wisconsinpoison.org  .     Suicide and Crisis Lifeline  988 http://988lifeline.org  .   Childhelp National Child Abuse Hotline  218.200.7079 http://Childhelphotline.org   .   National Sexual Assault Hotline  (264) 715-8632 (HOPE) http://Rainn.org   .     National Runaway Safeline  (402) 348-7034 (RUNAWAY) http://1800runaway.org  .   Pregnancy & Postpartum Support  Call/text 169-338-4831  [place]: http://ppsupportmn.org  [place]: http://[facility].com/wi  .   Substance Abuse National Helpline ([facility])  893-473-HELP (4737) http://Findtreatment.gov   .                DISCLAIMER: These resources have been generated via the Immediately Platform. Immediately does not endorse any service providers mentioned in this resource list. [facility] does not guarantee that the  services mentioned in this resource list will be available to you or will improve your health or wellness.    [facility]

## 2024-05-06 NOTE — PROGRESS NOTES
Assessment & Plan   Problem List Items Addressed This Visit          Nervous and Auditory    Stroke-like symptoms       Other    Personal history of malignant neoplasm of bladder    Relevant Orders    UA reflex to Microscopic (Completed)    Adult Urology  Referral     Other Visit Diagnoses       Hospital discharge follow-up    -  Primary    Screening for prostate cancer        Relevant Orders    PSA Screen GH (Completed)    Urinary incontinence, unspecified type        Relevant Orders    Adult Urology  Referral           With urinary leakage and bladder cancer in the past, completed PSA and urinalysis to rule out concerns.  Labs are normal.  Encouraged close monitoring of symptoms.   Encouraged increased water intake along with fluids.  Referred to urology for repeat yearly cystoscopy with history of bladder cancer.    Strokelike symptoms: Patient symptoms have resolved.  Encouraged to continue medications as previously prescribed.  Continue to follow with psychiatry.  No acute concerns at this time.  Patient has a stable neuroexam today.  No further imaging or lab work is warranted at this time.    Ordering of each unique test  30 minutes spent by me on the date of the encounter doing chart review, history and exam, documentation and further activities per the note   MED REC REQUIRED  Post Medication Reconciliation Status:  Discharge medications reconciled, continue medications without change  Nicotine/Tobacco Cessation  He reports that he has been smoking cigarettes. He has a 224 pack-year smoking history. He has been exposed to tobacco smoke. He has never used smokeless tobacco.  Nicotine/Tobacco Cessation Plan  Information offered: Patient not interested at this time      Depression Screening Follow Up        5/6/2024    11:03 AM   PHQ   PHQ-9 Total Score 13   Q9: Thoughts of better off dead/self-harm past 2 weeks Not at all         5/6/2024    11:03 AM   Last PHQ-9   1.  Little interest or  pleasure in doing things 3   2.  Feeling down, depressed, or hopeless 1   3.  Trouble falling or staying asleep, or sleeping too much 2   4.  Feeling tired or having little energy 2   5.  Poor appetite or overeating 1   6.  Feeling bad about yourself 3   7.  Trouble concentrating 1   8.  Moving slowly or restless 0   Q9: Thoughts of better off dead/self-harm past 2 weeks 0   PHQ-9 Total Score 13         Follow Up Actions Taken  Patient counseled, no additional follow up at this time.  Referred patient back to current mental health provider.     See Patient Instructions    No follow-ups on file.      Alek Hope is a 74 year old, presenting for the following health issues:  Hospital F/U        5/6/2024    11:23 AM   Additional Questions   Roomed by Dottie DAS CMA   Accompanied by Chris Saavedra Staff     [facility]       Hospital Follow-up Visit:    Hospital/Nursing Home/IP Rehab Facility: [facility]  Date of Admission: 4/27/24  Date of Discharge: 4/29/24  Reason(s) for Admission: Stroke like swmptoms  Was the patient in the ICU or did the patient experience delirium during hospitalization?  No  Do you have any other stressors you would like to discuss with your provider? No    Problems taking medications regularly:  None  Medication changes since discharge: None  Problems adhering to non-medication therapy:  None    Summary of hospitalization:  [facility] discharge summary reviewed  Diagnostic Tests/Treatments reviewed.  Follow up needed: none  Other Healthcare Providers Involved in Patient s Care:         None  Update since discharge: improved.         Plan of care communicated with patient and caregiver           Patient is coming today for hospital follow-up.  States that he had dizziness a few days ago however this has resolved.  For the last few days he has had no dizziness.  Eating is average.  Chronically has a decreased appetite.  Currently living at [facility].  Was  previously living at [place] however this housing fell through.  Currently working on finding new housing.  Seeing a counselor.  States that he had some swishing type noise in his ears a few days ago however this has resolved.  No current ear ringing or concerns.  No ear pain, cough or cold symptoms, fevers, chills.  No chest pain, palpitations, or problems breathing.  No GI or urinary symptoms.  Patient's mood has been stable.  Seeing a counselor.  Patient does complain of some urinary leakage at times.  It is a small amount.  History of bladder cancer in the past.  No increased confusion.  Speech is normal.  No current dizziness.  Patient is doing well.    Review of Systems  Constitutional, neuro, ENT, endocrine, pulmonary, cardiac, gastrointestinal, genitourinary, musculoskeletal, integument and psychiatric systems are negative, except as otherwise noted.      Objective    /76   Pulse 85   Temp 98.2  F (36.8  C) (Tympanic)   Wt 59.3 kg (130 lb 12.8 oz)   SpO2 95%   BMI 19.89 kg/m    Body mass index is 19.89 kg/m .  Physical Exam  Vitals and nursing note reviewed.   Constitutional:       Appearance: Normal appearance.   HENT:      Head: Normocephalic and atraumatic.      Right Ear: Tympanic membrane, ear canal and external ear normal.      Left Ear: Tympanic membrane, ear canal and external ear normal.      Mouth/Throat:      Mouth: Mucous membranes are moist.      Pharynx: Oropharynx is clear.   Eyes:      Extraocular Movements: Extraocular movements intact.      Conjunctiva/sclera: Conjunctivae normal.      Pupils: Pupils are equal, round, and reactive to light.   Cardiovascular:      Rate and Rhythm: Normal rate and regular rhythm.      Heart sounds: Normal heart sounds.   Pulmonary:      Effort: Pulmonary effort is normal.      Breath sounds: Normal breath sounds.   Musculoskeletal:         General: Normal range of motion.      Cervical back: Normal range of motion. No tenderness.   Skin:      General: Skin is warm and dry.   Neurological:      General: No focal deficit present.      Mental Status: He is alert and oriented to person, place, and time.      Cranial Nerves: Cranial nerves 2-12 are intact. No cranial nerve deficit.      Sensory: Sensation is intact. No sensory deficit.      Motor: Motor function is intact. No weakness.      Coordination: Coordination is intact. Romberg sign negative. Coordination normal. Finger-Nose-Finger Test normal.      Gait: Gait normal.      Deep Tendon Reflexes: Reflexes normal.   Psychiatric:         Mood and Affect: Mood normal.         Behavior: Behavior normal.         Thought Content: Thought content normal.         Judgment: Judgment normal.        Results for orders placed or performed in visit on 05/06/24   UA reflex to Microscopic     Status: Normal   Result Value Ref Range    Color Urine Yellow Colorless, Straw, Light Yellow, Yellow    Appearance Urine Clear Clear    Glucose Urine Negative Negative mg/dL    Bilirubin Urine Negative Negative    Ketones Urine Negative Negative mg/dL    Specific Gravity Urine 1.005 1.000 - 1.030    Blood Urine Negative Negative    pH Urine 6.5 5.0 - 9.0    Protein Albumin Urine Negative Negative mg/dL    Urobilinogen Urine Normal Normal, 2.0 mg/dL    Nitrite Urine Negative Negative    Leukocyte Esterase Urine Negative Negative    Narrative    Microscopic not indicated   PSA Screen GH     Status: Normal   Result Value Ref Range    Prostate Specific Antigen Screen 0.37 0.00 - 6.50 ng/mL    Narrative    This result is obtained using the Roche Elecsys total PSA method on the marlen e601 immunoassay analyzer. Results obtained with different assay methods or kits cannot be used interchangeably.       Answers submitted by the patient for this visit:  Patient Health Questionnaire (Submitted on 5/6/2024)  If you checked off any problems, how difficult have these problems made it for you to do your work, take care of things at home, or get  along with other people?: Somewhat difficult  PHQ9 TOTAL SCORE: 13          Signed Electronically by: Anna Jasso PA-C

## 2024-05-06 NOTE — LETTER
May 6, 2024      Jayy Cole     [facility] [address]        Dear ,    We are writing to inform you of your test results.    Your PSA which is a prostate cancer screen was normal.  Your urine sample is negative for blood.  You are due for a yearly cystoscopy which is a checkup on your bladder by urology.  I have placed this referral.    Resulted Orders   UA reflex to Microscopic   Result Value Ref Range    Color Urine Yellow Colorless, Straw, Light Yellow, Yellow    Appearance Urine Clear Clear    Glucose Urine Negative Negative mg/dL    Bilirubin Urine Negative Negative    Ketones Urine Negative Negative mg/dL    Specific Gravity Urine 1.005 1.000 - 1.030    Blood Urine Negative Negative    pH Urine 6.5 5.0 - 9.0    Protein Albumin Urine Negative Negative mg/dL    Urobilinogen Urine Normal Normal, 2.0 mg/dL    Nitrite Urine Negative Negative    Leukocyte Esterase Urine Negative Negative    Narrative    Microscopic not indicated   PSA Screen GH   Result Value Ref Range    Prostate Specific Antigen Screen 0.37 0.00 - 6.50 ng/mL    Narrative    This result is obtained using the Roche Elecsys total PSA method on the marlen e601 immunoassay analyzer. Results obtained with different assay methods or kits cannot be used interchangeably.       If you have any questions or concerns, please call the clinic at the number listed above.       Sincerely,      Anna Jasso PA-C

## 2024-05-13 NOTE — PROGRESS NOTES
Assessment & Plan     1. Chronic cough  2. Chronic obstructive pulmonary disease, unspecified COPD type (H)  Chronic, progressing.  Vitals and exam stable.  Chest x-ray updated showing no signs of acute infection or injury.  Discussed increasing and frequency as below.  Continue using albuterol as needed.  Again encouraged tobacco cessation.  - XR Chest 2 Views; Future  - fluticasone-salmeterol (ADVAIR) 500-50 MCG/ACT inhaler; Inhale 1 puff into the lungs every 12 hours  Dispense: 60 each; Refill: 3    MED REC REQUIRED  Post Medication Reconciliation Status:  Medication reconciliation previously completed during another office visit    1 or more chronic illnesses with progression  Ordering of each unique test  Review of results of EGD test  Prescription drug management    No follow-ups on file.    Alek Hope is a 74 year old, presenting for the following health issues:  Cough    History of Present Illness       Reason for visit:  Cough and wheezing  Symptom onset:  1-2 weeks ago  Symptoms include:  New onset of wheezibg and sob  Symptom intensity:  Moderate  Symptom progression:  Worsening  Had these symptoms before:  No  What makes it worse:  Smoking  What makes it better:  No    He eats 0-1 servings of fruits and vegetables daily.He consumes 1 sweetened beverage(s) daily.He exercises with enough effort to increase his heart rate 9 or less minutes per day.  He exercises with enough effort to increase his heart rate 3 or less days per week.   He is taking medications regularly.     Here with [facility] staff for evaluation of increased wheezing, coughing, shortness of breath.  Patient does have known COPD for which she continues on Advair daily and albuterol as needed.  Patient does continue to smoke regularly, 4 packs/day with a pack year history of 225.  Patient was recently hospitalized for strokelike symptoms and staff feel he was after that time that his breathing and coughing became worse.  No  associated fever/chills, GI symptoms, rash.  No known sick contacts.    PAST MEDICAL HISTORY: No past medical history on file.    PAST SURGICAL HISTORY:   Past Surgical History:   Procedure Laterality Date    COLONOSCOPY      2006    CYSTOSCOPY      05/12,surveillance    OTHER SURGICAL HISTORY      2008,,HERNIA REPAIR,incarcerated    OTHER SURGICAL HISTORY      2011,[id number],OTHER       FAMILY HISTORY:   Family History   Problem Relation Age of Onset    Cancer Mother         Cancer,Stomach cancer    Heart Disease Father         Heart Disease       SOCIAL HISTORY:   Social History     Tobacco Use    Smoking status: Every Day     Current packs/day: 4.00     Average packs/day: 4.0 packs/day for 56.0 years (224.0 ttl pk-yrs)     Types: Cigarettes     Passive exposure: Current    Smokeless tobacco: Never   Substance Use Topics    Alcohol use: No      No Known Allergies  Current Outpatient Medications   Medication Sig Dispense Refill    acetaminophen (TYLENOL) 500 MG tablet Take 1,000 mg by mouth 2 times daily      albuterol (PROAIR HFA/PROVENTIL HFA/VENTOLIN HFA) 108 (90 Base) MCG/ACT inhaler Inhale 2 puffs into the lungs 2 times daily      alendronate (FOSAMAX) 70 MG tablet Take 70 mg by mouth every 7 days - takes on Fridays      aspirin 81 MG tablet Take 81 mg by mouth daily with food      atorvastatin (LIPITOR) 40 MG tablet Take 40 mg by mouth at bedtime      calcium carbonate-vitamin D (CALTRATE) 600-10 MG-MCG per tablet Take 1 tablet by mouth 3 times daily      clopidogrel (PLAVIX) 75 MG tablet Take 75 mg by mouth every morning      ferrous sulfate (FE TABS) 325 (65 Fe) MG EC tablet Take 325 mg by mouth 3 times daily - given every other day      fluticasone (FLONASE) 50 MCG/ACT nasal spray Spray 1 spray into both nostrils daily      fluticasone-salmeterol (ADVAIR) 500-50 MCG/ACT inhaler Inhale 1 puff into the lungs every 12 hours 60 each 3    ibuprofen (ADVIL/MOTRIN) 600 MG tablet Take 1 tablet by mouth 3 times  "daily as needed      latanoprost (XALATAN) 0.005 % ophthalmic solution Place 1 drop into both eyes daily      multivitamin w/minerals (THERA-VIT-M) tablet Take 1 tablet by mouth daily      QUEtiapine (SEROQUEL) 300 MG tablet Take 300 mg by mouth at bedtime       No current facility-administered medications for this visit.           Objective    /62   Pulse 78   Temp (!) 96.5  F (35.8  C)   Resp 14   Ht 1.727 m (5' 8\")   Wt 63 kg (139 lb)   SpO2 95%   BMI 21.13 kg/m    Body mass index is 21.13 kg/m .  Physical Exam   General: Pleasant, in no apparent distress.  Eyes: Sclera are white and conjunctiva are clear bilaterally. Lacrimal apparatus free of erythema, edema, and discharge bilaterally.  Ears: External ears without erythema or edema. Tympanic membranes are pearly white and without erythema, scarring or perforations bilaterally. External auditory canals are free of foreign bodies, erythema, ulcers, and masses.  Nose: External nose is symmetrical and free of lesions and deformities.  Oropharynx: Oral mucosa is pink and without ulcers, nodules, and white patches. Tongue is symmetrical, pink, and without masses or lesions. Pharynx is pink, symmetrical, and without lesions. Uvula is midline. Tonsils are pink, symmetrical, and without edema, ulcers, or exudates, and 1+ bilaterally.  Neck: No cervical lymphadenopathy on inspection and palpation.  Cardiovascular: Regular rate and rhythm with S1 equal to S2. No murmurs, friction rubs, or gallops.   Respiratory: Lungs are resonant and clear to auscultation bilaterally. No wheezes, crackles, or rhonchi.  Skin: No jaundice, pallor, rashes, or lesions.  Psych: Appropriate mood and affect.    Results for orders placed or performed during the hospital encounter of 05/14/24   XR Chest 2 Views     Status: None    Narrative    PROCEDURE:  XR CHEST 2 VIEWS    HISTORY:  Chronic cough.     COMPARISON:  None.    FINDINGS:   The cardiac silhouette is normal in size. The " pulmonary vasculature is  normal.  There is linear atelectasis or scarring seen at both lung  bases.. There is an azygos fissure. There is a mid thoracic vertebral  body compression fracture of uncertain age. No pleural effusion or  pneumothorax.      Impression    IMPRESSION:  Linear atelectasis or scarring at the lung bases.      HALEY BOLTON MD         SYSTEM ID:  [id number]       Signed Electronically by: Mera Tuttle PA-C

## 2024-05-14 ENCOUNTER — HOSPITAL ENCOUNTER (OUTPATIENT)
Dept: GENERAL RADIOLOGY | Facility: OTHER | Age: 74
Discharge: HOME | End: 2024-05-14
Attending: PHYSICIAN ASSISTANT
Payer: MEDICARE

## 2024-05-14 ENCOUNTER — OFFICE VISIT (OUTPATIENT)
Dept: FAMILY MEDICINE | Facility: OTHER | Age: 74
End: 2024-05-14
Attending: PHYSICIAN ASSISTANT
Payer: MEDICARE

## 2024-05-14 VITALS
BODY MASS INDEX: 21.07 KG/M2 | DIASTOLIC BLOOD PRESSURE: 62 MMHG | TEMPERATURE: 96.5 F | WEIGHT: 139 LBS | HEART RATE: 78 BPM | RESPIRATION RATE: 14 BRPM | OXYGEN SATURATION: 95 % | HEIGHT: 68 IN | SYSTOLIC BLOOD PRESSURE: 130 MMHG

## 2024-05-14 DIAGNOSIS — J44.9 CHRONIC OBSTRUCTIVE PULMONARY DISEASE, UNSPECIFIED COPD TYPE (H): ICD-10-CM

## 2024-05-14 DIAGNOSIS — R05.3 CHRONIC COUGH: ICD-10-CM

## 2024-05-14 DIAGNOSIS — R05.3 CHRONIC COUGH: Primary | ICD-10-CM

## 2024-05-14 PROCEDURE — 71046 X-RAY EXAM CHEST 2 VIEWS: CPT

## 2024-05-14 PROCEDURE — G0463 HOSPITAL OUTPT CLINIC VISIT: HCPCS

## 2024-05-14 PROCEDURE — G0463 HOSPITAL OUTPT CLINIC VISIT: HCPCS | Mod: 25

## 2024-05-14 PROCEDURE — 99214 OFFICE O/P EST MOD 30 MIN: CPT | Performed by: PHYSICIAN ASSISTANT

## 2024-05-14 RX ORDER — FLUTICASONE PROPIONATE AND SALMETEROL 500; 50 UG/1; UG/1
1 POWDER RESPIRATORY (INHALATION) EVERY 12 HOURS
Qty: 60 EACH | Refills: 3 | Status: SHIPPED | OUTPATIENT
Start: 2024-05-14 | End: 2024-06-07

## 2024-05-14 ASSESSMENT — PAIN SCALES - GENERAL: PAINLEVEL: NO PAIN (0)

## 2024-05-14 NOTE — NURSING NOTE
Patient presents to clinic with ongoing cough. Patient states that its a smokers cough.  Sabi Marroquin LPN ....................  5/14/2024   8:39 AM  EXT 1223

## 2024-06-07 ENCOUNTER — ORDERS ONLY (AUTO-RELEASED) (OUTPATIENT)
Dept: FAMILY MEDICINE | Facility: OTHER | Age: 74
End: 2024-06-07
Payer: MEDICARE

## 2024-06-07 ENCOUNTER — OFFICE VISIT (OUTPATIENT)
Dept: FAMILY MEDICINE | Facility: OTHER | Age: 74
End: 2024-06-07
Attending: NURSE PRACTITIONER
Payer: MEDICARE

## 2024-06-07 VITALS
SYSTOLIC BLOOD PRESSURE: 120 MMHG | WEIGHT: 125.4 LBS | RESPIRATION RATE: 20 BRPM | HEART RATE: 92 BPM | TEMPERATURE: 97.8 F | OXYGEN SATURATION: 95 % | HEIGHT: 68 IN | BODY MASS INDEX: 19 KG/M2 | DIASTOLIC BLOOD PRESSURE: 70 MMHG

## 2024-06-07 DIAGNOSIS — Z13.6 ENCOUNTER FOR ABDOMINAL AORTIC ANEURYSM (AAA) SCREENING: ICD-10-CM

## 2024-06-07 DIAGNOSIS — R29.90 STROKE-LIKE SYMPTOMS: ICD-10-CM

## 2024-06-07 DIAGNOSIS — J44.9 CHRONIC OBSTRUCTIVE PULMONARY DISEASE, UNSPECIFIED COPD TYPE (H): ICD-10-CM

## 2024-06-07 DIAGNOSIS — F10.11 HISTORY OF ALCOHOL ABUSE: ICD-10-CM

## 2024-06-07 DIAGNOSIS — Z87.891 PERSONAL HISTORY OF TOBACCO USE: ICD-10-CM

## 2024-06-07 DIAGNOSIS — H04.123 DRY EYES: ICD-10-CM

## 2024-06-07 DIAGNOSIS — I42.9 CARDIOMYOPATHY, UNSPECIFIED TYPE (H): ICD-10-CM

## 2024-06-07 DIAGNOSIS — F20.9 SCHIZOPHRENIA, UNSPECIFIED TYPE (H): ICD-10-CM

## 2024-06-07 DIAGNOSIS — R32 URINARY INCONTINENCE, UNSPECIFIED TYPE: ICD-10-CM

## 2024-06-07 DIAGNOSIS — I47.10 SUPRAVENTRICULAR TACHYCARDIA (H): ICD-10-CM

## 2024-06-07 DIAGNOSIS — Z12.11 COLON CANCER SCREENING: ICD-10-CM

## 2024-06-07 DIAGNOSIS — Z00.00 ENCOUNTER FOR MEDICARE ANNUAL WELLNESS EXAM: Primary | ICD-10-CM

## 2024-06-07 DIAGNOSIS — M81.0 OSTEOPOROSIS, UNSPECIFIED OSTEOPOROSIS TYPE, UNSPECIFIED PATHOLOGICAL FRACTURE PRESENCE: ICD-10-CM

## 2024-06-07 DIAGNOSIS — Z86.73 HISTORY OF STROKE: ICD-10-CM

## 2024-06-07 PROCEDURE — G0463 HOSPITAL OUTPT CLINIC VISIT: HCPCS | Mod: 25

## 2024-06-07 PROCEDURE — G0439 PPPS, SUBSEQ VISIT: HCPCS | Performed by: NURSE PRACTITIONER

## 2024-06-07 PROCEDURE — G0296 VISIT TO DETERM LDCT ELIG: HCPCS | Performed by: NURSE PRACTITIONER

## 2024-06-07 PROCEDURE — 99214 OFFICE O/P EST MOD 30 MIN: CPT | Mod: 25 | Performed by: NURSE PRACTITIONER

## 2024-06-07 RX ORDER — LATANOPROST 50 UG/ML
1 SOLUTION/ DROPS OPHTHALMIC DAILY
Qty: 7.5 ML | Refills: 11 | Status: SHIPPED | OUTPATIENT
Start: 2024-06-07

## 2024-06-07 RX ORDER — CLOPIDOGREL BISULFATE 75 MG/1
75 TABLET ORAL EVERY MORNING
Qty: 90 TABLET | Refills: 4 | Status: SHIPPED | OUTPATIENT
Start: 2024-06-07

## 2024-06-07 RX ORDER — ALENDRONATE SODIUM 70 MG/1
70 TABLET ORAL
Qty: 12 TABLET | Refills: 4 | Status: SHIPPED | OUTPATIENT
Start: 2024-06-07

## 2024-06-07 RX ORDER — FLUTICASONE PROPIONATE AND SALMETEROL 500; 50 UG/1; UG/1
1 POWDER RESPIRATORY (INHALATION) EVERY 12 HOURS
Qty: 60 EACH | Refills: 3 | Status: SHIPPED | OUTPATIENT
Start: 2024-06-07

## 2024-06-07 RX ORDER — IBUPROFEN 600 MG/1
600 TABLET, FILM COATED ORAL 3 TIMES DAILY PRN
Qty: 90 TABLET | Refills: 3 | Status: SHIPPED | OUTPATIENT
Start: 2024-06-07

## 2024-06-07 RX ORDER — ATORVASTATIN CALCIUM 40 MG/1
40 TABLET, FILM COATED ORAL AT BEDTIME
Qty: 90 TABLET | Refills: 4 | Status: SHIPPED | OUTPATIENT
Start: 2024-06-07

## 2024-06-07 RX ORDER — ALBUTEROL SULFATE 90 UG/1
2 AEROSOL, METERED RESPIRATORY (INHALATION) 2 TIMES DAILY
Qty: 18 G | Refills: 11 | Status: SHIPPED | OUTPATIENT
Start: 2024-06-07

## 2024-06-07 RX ORDER — QUETIAPINE FUMARATE 300 MG/1
300 TABLET, FILM COATED ORAL AT BEDTIME
Qty: 90 TABLET | Refills: 4 | Status: SHIPPED | OUTPATIENT
Start: 2024-06-07

## 2024-06-07 RX ORDER — ASPIRIN 81 MG/1
81 TABLET ORAL DAILY
Qty: 90 TABLET | Refills: 4 | Status: SHIPPED | OUTPATIENT
Start: 2024-06-07

## 2024-06-07 SDOH — HEALTH STABILITY: PHYSICAL HEALTH: ON AVERAGE, HOW MANY DAYS PER WEEK DO YOU ENGAGE IN MODERATE TO STRENUOUS EXERCISE (LIKE A BRISK WALK)?: 5 DAYS

## 2024-06-07 ASSESSMENT — PAIN SCALES - GENERAL: PAINLEVEL: NO PAIN (0)

## 2024-06-07 ASSESSMENT — SOCIAL DETERMINANTS OF HEALTH (SDOH): HOW OFTEN DO YOU GET TOGETHER WITH FRIENDS OR RELATIVES?: ONCE A WEEK

## 2024-06-07 NOTE — PATIENT INSTRUCTIONS
"Preventive Care Advice   This is general advice we often give to help people stay healthy. Your care team may have specific advice just for you. Please talk to your care team about your own preventive care needs.  Lifestyle  Exercise at least 150 minutes each week (30 minutes a day, 5 days a week).  Do muscle strengthening activities 2 days a week. These help control your weight and prevent disease.  No smoking.  Wear sunscreen to prevent skin cancer.  Have your home tested for radon every 2 to 5 years. Radon is a colorless, odorless gas that can harm your lungs. To learn more, go to www.health.[facility].mn. and search for \"Radon in Homes.\"  Keep guns unloaded and locked up in a safe place like a safe or gun vault, or, use a gun lock and hide the keys. Always lock away bullets separately. To learn more, visit [place].gov and search for \"safe gun storage.\"  Nutrition  Eat 5 or more servings of fruits and vegetables each day.  Try wheat bread, brown rice and whole grain pasta (instead of white bread, rice, and pasta).  Get enough calcium and vitamin D. Check the label on foods and aim for 100% of the RDA (recommended daily allowance).  Regular exams  Have a dental exam and cleaning every 6 months.  See your health care team every year to talk about:  Any changes in your health.  Any medicines your care team has prescribed.  Preventive care, family planning, and ways to prevent chronic diseases.  Shots (vaccines)   HPV shots (up to age 26), if you've never had them before.  Hepatitis B shots (up to age 59), if you've never had them before.  COVID-19 shot: Get this shot when it's due.  Flu shot: Get a flu shot every year.  Tetanus shot: Get a tetanus shot every 10 years.  Pneumococcal, hepatitis A, and RSV shots: Ask your care team if you need these based on your risk.  Shingles shot (for age 50 and up).  General health tests  Diabetes screening:  Starting at age 35, Get screened for diabetes at least every 3 years.  If " you are younger than age 35, ask your care team if you should be screened for diabetes.  Cholesterol test: At age 39, start having a cholesterol test every 5 years, or more often if advised.  Bone density scan (DEXA): At age 50, ask your care team if you should have this scan for osteoporosis (brittle bones).  Hepatitis C: Get tested at least once in your life.  Abdominal aortic aneurysm screening: Talk to your doctor about having this screening if you:  Have ever smoked; and  Are biologically male; and  Are between the ages of 65 and 75.  STIs (sexually transmitted infections)  Before age 24: Ask your care team if you should be screened for STIs.  After age 24: Get screened for STIs if you're at risk. You are at risk for STIs (including HIV) if:  You are sexually active with more than one person.  You don't use condoms every time.  You or a partner was diagnosed with a sexually transmitted infection.  If you are at risk for HIV, ask about PrEP medicine to prevent HIV.  Get tested for HIV at least once in your life, whether you are at risk for HIV or not.  Cancer screening tests  Cervical cancer screening: If you have a cervix, begin getting regular cervical cancer screening tests at age 21. Most people who have regular screenings with normal results can stop after age 65. Talk about this with your provider.  Breast cancer scan (mammogram): If you've ever had breasts, begin having regular mammograms starting at age 40. This is a scan to check for breast cancer.  Colon cancer screening: It is important to start screening for colon cancer at age 45.  Have a colonoscopy test every 10 years (or more often if you're at risk) Or, ask your provider about stool tests like a FIT test every year or Cologuard test every 3 years.  To learn more about your testing options, visit: www.[employer]/767057.pdf.  For help making a decision, visit: [email or web address]/[email or web address].  Prostate cancer screening test: If you have a prostate and are age 55  to 69, ask your provider if you would benefit from a yearly prostate cancer screening test.  Lung cancer screening: If you are a current or former smoker age 50 to 80, ask your care team if ongoing lung cancer screenings are right for you.  For informational purposes only. Not to replace the advice of your health care provider. Copyright   2023 [employer]. All rights reserved. Clinically reviewed by the [facility]. [employer] 027906 - REV 04/24.    Preventing Falls: Care Instructions  Injuries and health problems such as trouble walking or poor eyesight can increase your risk of falling. So can some medicines. But there are things you can do to help prevent falls. You can exercise to get stronger. You can also arrange your home to make it safer.    Talk to your doctor about the medicines you take. Ask if any of them increase the risk of falls and whether they can be changed or stopped.   Try to exercise regularly. It can help improve your strength and balance. This can help lower your risk of falling.     Practice fall safety and prevention.    Wear low-heeled shoes that fit well and give your feet good support. Talk to your doctor if you have foot problems that make this hard.  Carry a cellphone or wear a medical alert device that you can use to call for help.  Use stepladders instead of chairs to reach high objects. Don't climb if you're at risk for falls. Ask for help, if needed.  Wear the correct eyeglasses, if you need them.    Make your home safer.    Remove rugs, cords, clutter, and furniture from walkways.  Keep your house well lit. Use night-lights in hallways and bathrooms.  Install and use sturdy handrails on stairways.  Wear nonskid footwear, even inside. Don't walk barefoot or in socks without shoes.    Be safe outside.    Use handrails, curb cuts, and ramps whenever possible.  Keep your hands free by using a shoulder bag or backpack.  Try to walk in well-lit  "areas. Watch out for uneven ground, changes in pavement, and debris.  Be careful in the winter. Walk on the grass or gravel when sidewalks are slippery. Use de-icer on steps and walkways. Add non-slip devices to shoes.    Put grab bars and nonskid mats in your shower or tub and near the toilet. Try to use a shower chair or bath bench when bathing.   Get into a tub or shower by putting in your weaker leg first. Get out with your strong side first. Have a phone or medical alert device in the bathroom with you.   Where can you learn more?  Go to https://www.[employer].net/patiented  Enter G117 in the search box to learn more about \"Preventing Falls: Care Instructions.\"  Current as of: July 17, 2023               Content Version: 14.0    2993-2701 [employer].   Care instructions adapted under license by your healthcare professional. If you have questions about a medical condition or this instruction, always ask your healthcare professional. [employer] disclaims any warranty or liability for your use of this information.      Hearing Loss: Care Instructions  Overview     Hearing loss is a sudden or slow decrease in how well you hear. It can range from slight to profound. Permanent hearing loss can occur with aging. It also can happen when you are exposed long-term to loud noise. Examples include listening to loud music, riding motorcycles, or being around other loud machines.  Hearing loss can affect your work and home life. It can make you feel lonely or depressed. You may feel that you have lost your independence. But hearing aids and other devices can help you hear better and feel connected to others.  Follow-up care is a key part of your treatment and safety. Be sure to make and go to all appointments, and call your doctor if you are having problems. It's also a good idea to know your test results and keep a list of the medicines you take.  How can you care for yourself at home?  Avoid " loud noises whenever possible. This helps keep your hearing from getting worse.  Always wear hearing protection around loud noises.  Wear a hearing aid as directed.  A professional can help you pick a hearing aid that will work best for you.  You can also get hearing aids over the counter for mild to moderate hearing loss.  Have hearing tests as your doctor suggests. They can show whether your hearing has changed. Your hearing aid may need to be adjusted.  Use other devices as needed. These may include:  Telephone amplifiers and hearing aids that can connect to a television, stereo, radio, or microphone.  Devices that use lights or vibrations. These alert you to the doorbell, a ringing telephone, or a baby monitor.  Television closed-captioning. This shows the words at the bottom of the screen. Most new TVs can do this.  TTY (text telephone). This lets you type messages back and forth on the telephone instead of talking or listening. These devices are also called TDD. When messages are typed on the keyboard, they are sent over the phone line to a receiving TTY. The message is shown on a monitor.  Use text messaging, social media, and email if it is hard for you to communicate by telephone.  Try to learn a listening technique called speechreading. It is not lipreading. You pay attention to people's gestures, expressions, posture, and tone of voice. These clues can help you understand what a person is saying. Face the person you are talking to, and have them face you. Make sure the lighting is good. You need to see the other person's face clearly.  Think about counseling if you need help to adjust to your hearing loss.  When should you call for help?  Watch closely for changes in your health, and be sure to contact your doctor if:    You think your hearing is getting worse.     You have new symptoms, such as dizziness or nausea.   Where can you learn more?  Go to https://www.healthwise.net/patiented  Enter R798 in the  "search box to learn more about \"Hearing Loss: Care Instructions.\"  Current as of: September 27, 2023               Content Version: 14.0    9949-7968 [employer].   Care instructions adapted under license by your healthcare professional. If you have questions about a medical condition or this instruction, always ask your healthcare professional. [employer] disclaims any warranty or liability for your use of this information.      Learning About Sleeping Well  What does sleeping well mean?     Sleeping well means getting enough sleep to feel good and stay healthy. How much sleep is enough varies among people.  The number of hours you sleep and how you feel when you wake up are both important. If you do not feel refreshed, you probably need more sleep. Another sign of not getting enough sleep is feeling tired during the day.  Experts recommend that adults get at least 7 or more hours of sleep per day. Children and older adults need more sleep.  Why is getting enough sleep important?  Getting enough quality sleep is a basic part of good health. When your sleep suffers, your physical health, mood, and your thoughts can suffer too. You may find yourself feeling more grumpy or stressed. Not getting enough sleep also can lead to serious problems, including injury, accidents, anxiety, and depression.  What might cause poor sleeping?  Many things can cause sleep problems, including:  Changes to your sleep schedule.  Stress. Stress can be caused by fear about a single event, such as giving a speech. Or you may have ongoing stress, such as worry about work or school.  Depression, anxiety, and other mental or emotional conditions.  Changes in your sleep habits or surroundings. This includes changes that happen where you sleep, such as noise, light, or sleeping in a different bed. It also includes changes in your sleep pattern, such as having jet lag or working a late shift.  Health problems, such as " "pain, breathing problems, and restless legs syndrome.  Lack of regular exercise.  Using alcohol, nicotine, or caffeine before bed.  How can you help yourself?  Here are some tips that may help you sleep more soundly and wake up feeling more refreshed.  Your sleeping area   Use your bedroom only for sleeping and sex. A bit of light reading may help you fall asleep. But if it doesn't, do your reading elsewhere in the house. Try not to use your TV, computer, smartphone, or tablet while you are in bed.  Be sure your bed is big enough to stretch out comfortably, especially if you have a sleep partner.  Keep your bedroom quiet, dark, and cool. Use curtains, blinds, or a sleep mask to block out light. To block out noise, use earplugs, soothing music, or a \"white noise\" machine.  Your evening and bedtime routine   Create a relaxing bedtime routine. You might want to take a warm shower or bath, or listen to soothing music.  Go to bed at the same time every night. And get up at the same time every morning, even if you feel tired.  What to avoid   Limit caffeine (coffee, tea, caffeinated sodas) during the day, and don't have any for at least 6 hours before bedtime.  Avoid drinking alcohol before bedtime. Alcohol can cause you to wake up more often during the night.  Try not to smoke or use tobacco, especially in the evening. Nicotine can keep you awake.  Limit naps during the day, especially close to bedtime.  Avoid lying in bed awake for too long. If you can't fall asleep or if you wake up in the middle of the night and can't get back to sleep within about 20 minutes, get out of bed and go to another room until you feel sleepy.  Avoid taking medicine right before bed that may keep you awake or make you feel hyper or energized. Your doctor can tell you if your medicine may do this and if you can take it earlier in the day.  If you can't sleep   Imagine yourself in a peaceful, pleasant scene. Focus on the details and feelings " "of being in a place that is relaxing.  Get up and do a quiet or boring activity until you feel sleepy.  Avoid drinking any liquids before going to bed to help prevent waking up often to use the bathroom.  Where can you learn more?  Go to https://www.HAUL.net/patiented  Enter J942 in the search box to learn more about \"Learning About Sleeping Well.\"  Current as of: July 10, 2023               Content Version: 14.0    6670-7615 [employer]   Care instructions adapted under license by your healthcare professional. If you have questions about a medical condition or this instruction, always ask your healthcare professional. [employer] disclaims any warranty or liability for your use of this information.      Bladder Training: Care Instructions  Your Care Instructions     Bladder training is used to treat urge incontinence and stress incontinence. Urge incontinence means that the need to urinate comes on so fast that you can't get to a toilet in time. Stress incontinence means that you leak urine because of pressure on your bladder. For example, it may happen when you laugh, cough, or lift something heavy.  Bladder training can increase how long you can wait before you have to urinate. It can also help your bladder hold more urine. And it can give you better control over the urge to urinate.  It is important to remember that bladder training takes a few weeks to a few months to make a difference. You may not see results right away, but don't give up.  Follow-up care is a key part of your treatment and safety. Be sure to make and go to all appointments, and call your doctor if you are having problems. It's also a good idea to know your test results and keep a list of the medicines you take.  How can you care for yourself at home?  Work with your doctor to come up with a bladder training program that is right for you. You may use one or more of the following methods.  Delayed urination  In " "the beginning, try to keep from urinating for 5 minutes after you first feel the need to go.  While you wait, take deep, slow breaths to relax. Kegel exercises can also help you delay the need to go to the bathroom.  After some practice, when you can easily wait 5 minutes to urinate, try to wait 10 minutes before you urinate.  Slowly increase the waiting period until you are able to control when you have to urinate.  Scheduled urination  Empty your bladder when you first wake up in the morning.  Schedule times throughout the day when you will urinate.  Start by going to the bathroom every hour, even if you don't need to go.  Slowly increase the time between trips to the bathroom.  When you have found a schedule that works well for you, keep doing it.  If you wake up during the night and have to urinate, do it. Apply your schedule to waking hours only.  Kegel exercises  These tighten and strengthen pelvic muscles, which can help you control the flow of urine. (If doing these exercises causes pain, stop doing them and talk with your doctor.) To do Kegel exercises:  Squeeze your muscles as if you were trying not to pass gas. Or squeeze your muscles as if you were stopping the flow of urine. Your belly, legs, and buttocks shouldn't move.  Hold the squeeze for 3 seconds, then relax for 5 to 10 seconds.  Start with 3 seconds, then add 1 second each week until you are able to squeeze for 10 seconds.  Repeat the exercise 10 times a session. Do 3 to 8 sessions a day.  When should you call for help?  Watch closely for changes in your health, and be sure to contact your doctor if:    Your incontinence is getting worse.     You do not get better as expected.   Where can you learn more?  Go to https://www.[employer].net/patiented  Enter V684 in the search box to learn more about \"Bladder Training: Care Instructions.\"  Current as of: November 15, 2023               Content Version: 14.0    2044-3469 [employer]. "   Care instructions adapted under license by your healthcare professional. If you have questions about a medical condition or this instruction, always ask your healthcare professional. [employer] disclaims any warranty or liability for your use of this information.      Lung Cancer Screening   Frequently Asked Questions  If you are at high-risk for lung cancer, getting screened with low-dose computed tomography (LDCT) every year can help save your life. This handout offers answers to some of the most common questions about lung cancer screening. If you have other questions, please call [phone] ([phone]).     What is it?  Lung cancer screening uses special X-ray technology to create an image of your lung tissue. The exam is quick and easy and takes less than 10 seconds. We don t give you any medicine or use any needles. You can eat before and after the exam. You don t need to change your clothes as long as the clothing on your chest doesn t contain metal. But, you do need to be able to hold your breath for at least 6 seconds during the exam.    What is the goal of lung cancer screening?  The goal of lung cancer screening is to save lives. Many times, lung cancer is not found until a person starts having physical symptoms. Lung cancer screening can help detect lung cancer in the earliest stages when it may be easier to treat.    Who should be screened for lung cancer?  We suggest lung cancer screening for anyone who is at high-risk for lung cancer. You are in the high-risk group if you:      are between the ages of 55 and 79, and    have smoked at least 1 pack of cigarettes a day for 20 or more years, and    still smoke or have quit within the past 15 years.    However, if you have a new cough or shortness of breath, you should talk to your doctor before being screened.    Why does it matter if I have symptoms?  Certain symptoms can be a sign that you have a condition in your lungs that  should be checked and treated by your doctor. These symptoms include fever, chest pain, a new or changing cough, shortness of breath that you have never felt before, coughing up blood or unexplained weight loss. Having any of these symptoms can greatly affect the results of lung cancer screening.       Should all smokers get an LDCT lung cancer screening exam?  It depends. Lung cancer screening is for a very specific group of men and women who have a history of heavy smoking over a long period of time (see  Who should be screened for lung cancer  above).  I am in the high-risk group, but have been diagnosed with cancer in the past. Is LDCT lung cancer screening right for me?  In some cases, you should not have LDCT lung screening, such as when your doctor is already following your cancer with CT scan studies. Your doctor will help you decide if LDCT lung screening is right for you.  Do I need to have a screening exam every year?  Yes. If you are in the high-risk group described earlier, you should get an LDCT lung cancer screening exam every year until you are 79, or are no longer willing or able to undergo screening and possible procedures to diagnose and treat lung cancer.  How effective is LDCT at preventing death from lung cancer?  Studies have shown that LDCT lung cancer screening can lower the risk of death from lung cancer by 20 percent in people who are at high-risk.  What are the risks?  There are some risks and limitations of LDCT lung cancer screening. We want to make sure you understand the risks and benefits, so please let us know if you have any questions. Your doctor may want to talk with you more about these risks.    Radiation exposure: As with any exam that uses radiation, there is a very small increased risk of cancer. The amount of radiation in LDCT is small--about the same amount a person would get from a mammogram. Your doctor orders the exam when he or she feels the potential benefits outweigh  the risks.    False negatives: No test is perfect, including LDCT. It is possible that you may have a medical condition, including lung cancer, that is not found during your exam. This is called a false negative result.    False positives and more testing: LDCT very often finds something in the lung that could be cancer, but in fact is not. This is called a false positive result. False positive tests often cause anxiety. To make sure these findings are not cancer, you may need to have more tests. These tests will be done only if you give us permission. Sometimes patients need a treatment that can have side effects, such as a biopsy. For more information on false positives, see  What can I expect from the results?     Findings not related to lung cancer: Your LDCT exam also takes pictures of areas of your body next to your lungs. In a very small number of cases, the CT scan will show an abnormal finding in one of these areas, such as your kidneys, adrenal glands, liver or thyroid. This finding may not be serious, but you may need more tests. Your doctor can help you decide what other tests you may need, if any.  What can I expect from the results?  About 1 out of 4 LDCT exams will find something that may need more tests. Most of the time, these findings are lung nodules. Lung nodules are very small collections of tissue in the lung. These nodules are very common, and the vast majority--more than 97 percent--are not cancer (benign). Most are normal lymph nodes or small areas of scarring from past infections.  But, if a small lung nodule is found to be cancer, the cancer can be cured more than 90 percent of the time. To know if the nodule is cancer, we may need to get more images before your next yearly screening exam. If the nodule has suspicious features (for example, it is large, has an odd shape or grows over time), we will refer you to a specialist for further testing.  Will my doctor also get the results?  Yes.  Your doctor will get a copy of your results.  Is it okay to keep smoking now that there s a cancer screening exam?  No. Tobacco is one of the strongest cancer-causing agents. It causes not only lung cancer, but other cancers and cardiovascular (heart) diseases as well. The damage caused by smoking builds over time. This means that the longer you smoke, the higher your risk of disease. While it is never too late to quit, the sooner you quit, the better.  Where can I find help to quit smoking?  The best way to prevent lung cancer is to stop smoking. If you have already quit smoking, congratulations and keep it up! For help on quitting smoking, please call [employer] at 3-370-QUITNOW ([phone]) or the [employer] at 1-480.157.3343 to find local resources near you.  One-on-one health coaching:  If you d prefer to work individually with a health care provider on tobacco cessation, we offer:      Medication Therapy Management:  Our specially trained pharmacists work closely with you and your doctor to help you quit smoking.  Call [phone] or [phone] (toll free).

## 2024-06-07 NOTE — NURSING NOTE
Patient presents today for physical. Patient was previously at [facility] and is now at [facility].    Medication Reconciliation Complete    Jasmin Tabor LPN  6/7/2024 2:06 PM

## 2024-06-07 NOTE — PROGRESS NOTES
Preventive Care Visit  [facility] AND \[facility] [place] [facility]\""  MOUNIKA Saldana CNP, Nurse Practitioner - Family  Jun 7, 2024      Assessment & Plan   Problem List Items Addressed This Visit          Nervous and Auditory    Stroke-like symptoms    Relevant Medications    clopidogrel (PLAVIX) 75 MG tablet       Respiratory    COPD (chronic obstructive pulmonary disease) (H)    Relevant Medications    fluticasone-salmeterol (ADVAIR) 500-50 MCG/ACT inhaler    albuterol (PROAIR HFA/PROVENTIL HFA/VENTOLIN HFA) 108 (90 Base) MCG/ACT inhaler       Circulatory    Supraventricular tachycardia (H24)    Relevant Medications    clopidogrel (PLAVIX) 75 MG tablet    atorvastatin (LIPITOR) 40 MG tablet    aspirin 81 MG EC tablet    Other Relevant Orders    Adult Cardiology Eval  Referral    Cardiomyopathy (H)    Relevant Medications    atorvastatin (LIPITOR) 40 MG tablet    aspirin 81 MG EC tablet    Other Relevant Orders    Adult Cardiology Eval  Referral       Behavioral    Schizophrenia (H)    Relevant Medications    QUEtiapine (SEROQUEL) 300 MG tablet       Other    History of stroke    Relevant Medications    ibuprofen (ADVIL/MOTRIN) 600 MG tablet    clopidogrel (PLAVIX) 75 MG tablet    History of alcohol abuse     Other Visit Diagnoses       Encounter for Medicare annual wellness exam    -  Primary    Personal history of tobacco use        Relevant Orders    Prof fee: Shared Decision Making for Lung Cancer Screening (Completed)    CT Chest Lung Cancer Scrn Low Dose wo    Colon cancer screening        Relevant Orders    COLOGUARD(EXACT SCIENCES)    Dry eyes        Relevant Medications    latanoprost (XALATAN) 0.005 % ophthalmic solution    Osteoporosis, unspecified osteoporosis type, unspecified pathological fracture presence        Relevant Medications    ibuprofen (ADVIL/MOTRIN) 600 MG tablet    aspirin 81 MG EC tablet    alendronate (FOSAMAX) 70 MG tablet    Urinary incontinence, unspecified type             History of stroke, strokelike symptoms, supraventricular tachycardia, cardiomyopathy stable.  No recent cardiology involvement.  Refilled Plavix, atorvastatin and aspirin.  Referral to cardiology for secondary consultation.  COPD stable, refilled Advair and albuterol inhaler, not interested in smoking cessation.  Order for lung cancer screening placed.  Diagnosed with osteoporosis, no recent fractures, refilled alendronate.  Dry eyes, takes latanoprost eyedrops, refilled  Discussed colon cancer screening, interested in Cologuard, order placed  Urinary incontinence, pull ups/briefs ordered through [facility]  History of alcohol abuse, currently in remission  Schizophrenia, continue on quetiapine, refilled medication today.  Continue with mental health involvement  In the fall recommend RSV vaccine along with influenza  AAA screening ordered      Patient has been advised of split billing requirements and indicates understanding: Yes       Counseling  Appropriate preventive services were discussed with this patient, including applicable screening as appropriate for fall prevention, nutrition, physical activity, Tobacco-use cessation, weight loss and cognition.  Checklist reviewing preventive services available has been given to the patient.  Reviewed patient's diet, addressing concerns and/or questions.   The patient was instructed to see the dentist every 6 months.   Discussed possible causes of fatigue. The patient was provided with written information regarding signs of hearing loss.   Information on urinary incontinence and treatment options given to patient.         No follow-ups on file.      Alek Hope is a 74 year old, presenting for the following:  Physical          Health Care Directive  Patient does not have a Health Care Directive or Living Will:     HPI    He presents to clinic today for annual wellness exam.  He is currently in a group home.  He was in assisted living for about a week.   He is currently in assisted living/group home.  Was in another living situation, went to crisis housing and then assisted living.  He does need refill of all medications, also needing in order for polyp/briefs to [facility].  He denies any health concerns today.  Continues to work with mental health provider for diagnosis of schizophrenia.        6/7/2024   General Health   How would you rate your overall physical health? (!) FAIR   Feel stress (tense, anxious, or unable to sleep) To some extent   (!) STRESS CONCERN      6/7/2024   Nutrition   Diet: Other   If other, please elaborate: none         6/7/2024   Exercise   Days per week of moderate/strenous exercise 5 days         6/7/2024   Social Factors   Frequency of gathering with friends or relatives Once a week   Worry food won't last until get money to buy more No   Food not last or not have enough money for food? No   Do you have housing?  Yes   Are you worried about losing your housing? No   Lack of transportation? No   Unable to get utilities (heat,electricity)? No         6/7/2024   Fall Risk   Fallen 2 or more times in the past year? No   Trouble with walking or balance? Yes           6/7/2024   Activities of Daily Living- Home Safety   Needs help with the following daily activites None of the above   Safety concerns in the home None of the above         6/7/2024   Dental   Dentist two times every year? (!) NO         6/7/2024   Hearing Screening   Hearing concerns? (!) I FEEL THAT PEOPLE ARE MUMBLING OR NOT SPEAKING CLEARLY.         6/7/2024   Driving Risk Screening   Patient/family members have concerns about driving No         6/7/2024   General Alertness/Fatigue Screening   Have you been more tired than usual lately? (!) YES         6/7/2024   Urinary Incontinence Screening   Bothered by leaking urine in past 6 months Yes         6/7/2024   TB Screening   Were you born outside of the [place]? No         Today's PHQ-2 Score:       6/7/2024     1:55  PM   PHQ-2 ( 1999 [employer])   Q1: Little interest or pleasure in doing things 0   Q2: Feeling down, depressed or hopeless 0   PHQ-2 Score 0   Q1: Little interest or pleasure in doing things Not at all   Q2: Feeling down, depressed or hopeless Not at all   PHQ-2 Score 0           6/7/2024   Substance Use   Alcohol more than 3/day or more than 7/wk No   Do you have a current opioid prescription? No   How severe/bad is pain from 1 to 10? 1/10   Do you use any other substances recreationally? No     Social History     Tobacco Use    Smoking status: Every Day     Current packs/day: 4.00     Average packs/day: 4.0 packs/day for 56.0 years (224.0 ttl pk-yrs)     Types: Cigarettes     Passive exposure: Current    Smokeless tobacco: Never   Vaping Use    Vaping status: Never Used   Substance Use Topics    Alcohol use: No    Drug use: Not Currently     Comment: Drug use: Not Asked       ASCVD Risk   The ASCVD Risk score (Du EPSTEIN, et al., 2019) failed to calculate for the following reasons:    The patient has a prior MI or stroke diagnosis            Reviewed and updated as needed this visit by Provider   Tobacco  Allergies  Meds  Problems  Med Hx  Surg Hx  Fam Hx            History reviewed. No pertinent past medical history.  Past Surgical History:   Procedure Laterality Date    COLONOSCOPY      2006    CYSTOSCOPY      05/12,surveillance    OTHER SURGICAL HISTORY      2008,,HERNIA REPAIR,incarcerated    OTHER SURGICAL HISTORY      2011,600000,OTHER     Patient Active Problem List   Diagnosis    Cardiac arrhythmia    COPD (chronic obstructive pulmonary disease) (H)    Personal history of malignant neoplasm of bladder    Schizophrenia (H)    Slurred speech    Fall    Stroke-like symptoms    Supraventricular tachycardia (H24)    Shortness of breath    Primary open angle glaucoma (POAG) of both eyes, mild stage    Nuclear sclerotic cataract of both eyes    History of stroke    History of alcohol abuse     Cardiomyopathy (H)    Astigmatism of both eyes with presbyopia     Past Surgical History:   Procedure Laterality Date    COLONOSCOPY      2006    CYSTOSCOPY      05/12,surveillance    OTHER SURGICAL HISTORY      2008,,HERNIA REPAIR,incarcerated    OTHER SURGICAL HISTORY      2011,[id number],OTHER       Social History     Tobacco Use    Smoking status: Every Day     Current packs/day: 4.00     Average packs/day: 4.0 packs/day for 56.0 years (224.0 ttl pk-yrs)     Types: Cigarettes     Passive exposure: Current    Smokeless tobacco: Never   Substance Use Topics    Alcohol use: No     Family History   Problem Relation Age of Onset    Cancer Mother         Cancer,Stomach cancer    Heart Disease Father         Heart Disease         Current Outpatient Medications   Medication Sig Dispense Refill    albuterol (PROAIR HFA/PROVENTIL HFA/VENTOLIN HFA) 108 (90 Base) MCG/ACT inhaler Inhale 2 puffs into the lungs 2 times daily 18 g 11    alendronate (FOSAMAX) 70 MG tablet Take 1 tablet (70 mg) by mouth every 7 days - takes on Fridays 12 tablet 4    aspirin 81 MG EC tablet Take 1 tablet (81 mg) by mouth daily 90 tablet 4    atorvastatin (LIPITOR) 40 MG tablet Take 1 tablet (40 mg) by mouth at bedtime 90 tablet 4    clopidogrel (PLAVIX) 75 MG tablet Take 1 tablet (75 mg) by mouth every morning 90 tablet 4    fluticasone-salmeterol (ADVAIR) 500-50 MCG/ACT inhaler Inhale 1 puff into the lungs every 12 hours 60 each 3    ibuprofen (ADVIL/MOTRIN) 600 MG tablet Take 1 tablet (600 mg) by mouth 3 times daily as needed for mild pain 90 tablet 3    latanoprost (XALATAN) 0.005 % ophthalmic solution Place 1 drop into both eyes daily 7.5 mL 11    QUEtiapine (SEROQUEL) 300 MG tablet Take 1 tablet (300 mg) by mouth at bedtime 90 tablet 4    acetaminophen (TYLENOL) 500 MG tablet Take 1,000 mg by mouth 2 times daily       No Known Allergies  Current providers sharing in care for this patient include:  Patient Care Team:  Sonali Reynoso APRN  "CNP as PCP - General (Nurse Practitioner - Family)  Ernesto Vasques MD as Assigned Surgical Provider  Mera Tuttle PA-C as Assigned PCP    The following health maintenance items are reviewed in Epic and correct as of today:  Health Maintenance   Topic Date Due    SPIROMETRY  Never done    COPD ACTION PLAN  Never done    COLORECTAL CANCER SCREENING  Never done    HEPATITIS C SCREENING  Never done    LUNG CANCER SCREENING  Never done    RSV VACCINE (Pregnancy & 60+) (1 - 1-dose 60+ series) Never done    AORTIC ANEURYSM SCREENING (SYSTEM ASSIGNED)  Never done    COVID-19 Vaccine (1 - 2023-24 season) Never done    INFLUENZA VACCINE (Season Ended) 09/01/2024    LIPID  04/27/2025    NICOTINE/TOBACCO CESSATION COUNSELING Q 1 YR  05/06/2025    MEDICARE ANNUAL WELLNESS VISIT  06/07/2025    FALL RISK ASSESSMENT  06/07/2025    GLUCOSE  04/28/2027    ADVANCE CARE PLANNING  05/06/2029    DTAP/TDAP/TD IMMUNIZATION (3 - Td or Tdap) 02/14/2033    PHQ-2 (once per calendar year)  Completed    Pneumococcal Vaccine: 65+ Years  Completed    ZOSTER IMMUNIZATION  Completed    IPV IMMUNIZATION  Aged Out    HPV IMMUNIZATION  Aged Out    MENINGITIS IMMUNIZATION  Aged Out    RSV MONOCLONAL ANTIBODY  Aged Out            Objective    Exam  /70   Pulse 92   Temp 97.8  F (36.6  C)   Resp 20   Ht 1.727 m (5' 8\")   Wt 56.9 kg (125 lb 6.4 oz)   SpO2 95%   BMI 19.07 kg/m     Estimated body mass index is 19.07 kg/m  as calculated from the following:    Height as of this encounter: 1.727 m (5' 8\").    Weight as of this encounter: 56.9 kg (125 lb 6.4 oz).    Physical Exam  GENERAL: alert and no distress  EYES: Eyes grossly normal to inspection, PERRL and conjunctivae and sclerae normal  HENT: ear canals and TM's normal, nose and mouth without ulcers or lesions  NECK: no adenopathy, no asymmetry, masses, or scars  RESP: lungs clear to auscultation - no rales, rhonchi or wheezes  CV: regular rate and rhythm, normal S1 S2, no S3 or S4, no " murmur, click or rub, no peripheral edema  ABDOMEN: soft, nontender, no hepatosplenomegaly, no masses and bowel sounds normal  MS: no gross musculoskeletal defects noted, no edema  SKIN: no suspicious lesions or rashes  NEURO: Normal strength and tone, mentation intact and speech normal  PSYCH: mentation appears normal, affect normal/bright         No data to display                       Signed Electronically by: MOUNIKA Saldana CNP  Lung Cancer Screening Shared Decision Making Visit     Jayy Cole, a 74 year old male, is eligible for lung cancer screening    History   Smoking Status    Every Day    Types: Cigarettes   Smokeless Tobacco    Never       I have discussed with patient the risks and benefits of screening for lung cancer with low-dose CT.     The risks include:    radiation exposure: one low dose chest CT has as much ionizing radiation as about 15 chest x-rays, or 6 months of background radiation living in [place]      false positives: most findings/nodules are NOT cancer, but some might still require additional diagnostic evaluation, including biopsy    over-diagnosis: some slow growing cancers that might never have been clinically significant will be detected and treated unnecessarily     The benefit of early detection of lung cancer is contingent upon adherence to annual screening or more frequent follow up if indicated.     Furthermore, to benefit from screening, Jayy must be willing and able to undergo diagnostic procedures, if indicated. Although no specific guide is available for determining severity of comorbidities, it is reasonable to withhold screening in patients who have greater mortality risk from other diseases.     We did discuss that the best way to prevent lung cancer is to not smoke.    Some patients may value a numeric estimation of lung cancer risk when evaluating if lung cancer screening is right for them, here is one calculator:    ShouldIScreen

## 2024-08-20 ENCOUNTER — HOSPITAL ENCOUNTER (OUTPATIENT)
Dept: ULTRASOUND IMAGING | Facility: OTHER | Age: 74
Discharge: HOME | End: 2024-08-20
Attending: NURSE PRACTITIONER
Payer: MEDICARE

## 2024-08-20 ENCOUNTER — HOSPITAL ENCOUNTER (OUTPATIENT)
Dept: CT IMAGING | Facility: OTHER | Age: 74
Discharge: HOME | End: 2024-08-20
Attending: NURSE PRACTITIONER
Payer: MEDICARE

## 2024-08-20 DIAGNOSIS — Z13.6 ENCOUNTER FOR ABDOMINAL AORTIC ANEURYSM (AAA) SCREENING: ICD-10-CM

## 2024-08-20 DIAGNOSIS — Z87.891 PERSONAL HISTORY OF TOBACCO USE: ICD-10-CM

## 2024-08-20 PROCEDURE — 71271 CT THORAX LUNG CANCER SCR C-: CPT

## 2024-08-20 PROCEDURE — 76706 US ABDL AORTA SCREEN AAA: CPT

## 2024-08-21 ENCOUNTER — TELEPHONE (OUTPATIENT)
Dept: FAMILY MEDICINE | Facility: OTHER | Age: 74
End: 2024-08-21
Payer: MEDICARE

## 2024-08-21 DIAGNOSIS — Z87.891 PERSONAL HISTORY OF TOBACCO USE, PRESENTING HAZARDS TO HEALTH: Primary | ICD-10-CM

## 2024-08-26 DIAGNOSIS — Z85.51 PERSONAL HISTORY OF MALIGNANT NEOPLASM OF BLADDER: Primary | ICD-10-CM

## 2024-10-07 DIAGNOSIS — J44.9 CHRONIC OBSTRUCTIVE PULMONARY DISEASE, UNSPECIFIED COPD TYPE (H): ICD-10-CM

## 2024-10-08 RX ORDER — FLUTICASONE PROPIONATE AND SALMETEROL 500; 50 UG/1; UG/1
1 POWDER RESPIRATORY (INHALATION) EVERY 12 HOURS
Qty: 60 EACH | Refills: 7 | Status: SHIPPED | OUTPATIENT
Start: 2024-10-08

## 2024-10-08 NOTE — TELEPHONE ENCOUNTER
[id number] ([employer] of [place] sent Rx request for the following:      Requested Prescriptions   Pending Prescriptions Disp Refills    fluticasone-salmeterol (ADVAIR) 500-50 MCG/ACT inhaler [Pharmacy Med Name: FLUTICAS/SALMETEROL 500-50 INH]  3     Sig: INHALE 1 PUFF INTO THE LUNGS EVERY 12 HOURS   Last Prescription Date:   6/7/24  Last Fill Qty/Refills:         60, R-3    Last Office Visit:              6/7/24 (Px)   Future Office visit:           None    Prescription refilled per RN Medication Refill Policy.................... Odilia Glynn RN ....................  10/8/2024   10:35 AM

## 2024-11-13 ENCOUNTER — TELEPHONE (OUTPATIENT)
Dept: FAMILY MEDICINE | Facility: OTHER | Age: 74
End: 2024-11-13

## 2024-11-13 DIAGNOSIS — R05.1 ACUTE COUGH: Primary | ICD-10-CM

## 2024-11-13 RX ORDER — DIPHENHYDRAMINE HCL 25 MG
CAPSULE ORAL
COMMUNITY
Start: 2024-11-13

## 2024-11-13 NOTE — TELEPHONE ENCOUNTER
Patient is requesting orders for PRN Nyquil as he has a nagging cough. He does have orders for PRN Dayquil but is wanting Nyquil as well.    Jasmin Tabor LPN on 11/13/2024 at 4:04 PM

## 2024-11-13 NOTE — TELEPHONE ENCOUNTER
Melissa from [facility] is requesting an order for Nyquil be faxed to [phone] for the patient. He cannot take Nyquil at the facility without an order.     Serena Fernandez on 11/13/2024 at 11:35 AM

## 2024-11-13 NOTE — TELEPHONE ENCOUNTER
Returned call to Melissa at [facility]. She had stepped out and they asked for a return call in 30 minutes.    Jasmin Tabor LPN on 11/13/2024 at 2:33 PM

## 2024-11-21 ENCOUNTER — MEDICAL CORRESPONDENCE (OUTPATIENT)
Dept: HEALTH INFORMATION MANAGEMENT | Facility: OTHER | Age: 74
End: 2024-11-21
Payer: MEDICARE

## 2025-01-09 ENCOUNTER — APPOINTMENT (OUTPATIENT)
Dept: CT IMAGING | Facility: OTHER | Age: 75
End: 2025-01-09
Attending: PHYSICIAN ASSISTANT
Payer: MEDICARE

## 2025-01-09 ENCOUNTER — HOSPITAL ENCOUNTER (EMERGENCY)
Facility: OTHER | Age: 75
Discharge: HOME | End: 2025-01-09
Attending: PHYSICIAN ASSISTANT
Payer: MEDICARE

## 2025-01-09 ENCOUNTER — APPOINTMENT (OUTPATIENT)
Dept: GENERAL RADIOLOGY | Facility: OTHER | Age: 75
End: 2025-01-09
Attending: PHYSICIAN ASSISTANT
Payer: MEDICARE

## 2025-01-09 VITALS
HEIGHT: 68 IN | DIASTOLIC BLOOD PRESSURE: 82 MMHG | TEMPERATURE: 99.2 F | RESPIRATION RATE: 24 BRPM | HEART RATE: 91 BPM | BODY MASS INDEX: 18.19 KG/M2 | WEIGHT: 120 LBS | SYSTOLIC BLOOD PRESSURE: 128 MMHG | OXYGEN SATURATION: 100 %

## 2025-01-09 DIAGNOSIS — J44.9 COPD (CHRONIC OBSTRUCTIVE PULMONARY DISEASE) (H): ICD-10-CM

## 2025-01-09 DIAGNOSIS — J18.9 PNEUMONIA: ICD-10-CM

## 2025-01-09 LAB
ALBUMIN SERPL BCG-MCNC: 3.6 G/DL (ref 3.5–5.2)
ALLEN'S TEST: NO
ALP SERPL-CCNC: 84 U/L (ref 40–150)
ALT SERPL W P-5'-P-CCNC: 36 U/L (ref 0–70)
ANION GAP SERPL CALCULATED.3IONS-SCNC: 14 MMOL/L (ref 7–15)
AST SERPL W P-5'-P-CCNC: 36 U/L (ref 0–45)
ATRIAL RATE - MUSE: 104 BPM
BASE EXCESS BLDA CALC-SCNC: 2.4 MMOL/L (ref -3–3)
BASOPHILS # BLD MANUAL: 0 10E3/UL (ref 0–0.2)
BASOPHILS NFR BLD MANUAL: 0 %
BILIRUB SERPL-MCNC: 0.6 MG/DL
BUN SERPL-MCNC: 17.2 MG/DL (ref 8–23)
CALCIUM SERPL-MCNC: 8.5 MG/DL (ref 8.8–10.4)
CHLORIDE SERPL-SCNC: 96 MMOL/L (ref 98–107)
CREAT SERPL-MCNC: 0.65 MG/DL (ref 0.67–1.17)
CRP SERPL-MCNC: 173.19 MG/L
D DIMER PPP FEU-MCNC: 0.65 UG/ML FEU (ref 0–0.5)
DIASTOLIC BLOOD PRESSURE - MUSE: NORMAL MMHG
EGFRCR SERPLBLD CKD-EPI 2021: >90 ML/MIN/1.73M2
EOSINOPHIL # BLD MANUAL: 0 10E3/UL (ref 0–0.7)
EOSINOPHIL NFR BLD MANUAL: 0 %
ERYTHROCYTE [DISTWIDTH] IN BLOOD BY AUTOMATED COUNT: 14.4 % (ref 10–15)
FLUAV RNA SPEC QL NAA+PROBE: NEGATIVE
FLUBV RNA RESP QL NAA+PROBE: NEGATIVE
GLUCOSE SERPL-MCNC: 136 MG/DL (ref 70–99)
HCO3 BLD-SCNC: 26 MMOL/L (ref 21–28)
HCO3 SERPL-SCNC: 24 MMOL/L (ref 22–29)
HCT VFR BLD AUTO: 41.1 % (ref 40–53)
HGB BLD-MCNC: 14 G/DL (ref 13.3–17.7)
HOLD SPECIMEN: NORMAL
HOLD SPECIMEN: NORMAL
INTERPRETATION ECG - MUSE: NORMAL
LACTATE SERPL-SCNC: 1.5 MMOL/L (ref 0.7–2)
LACTATE SERPL-SCNC: 1.6 MMOL/L (ref 0.7–2)
LYMPHOCYTES # BLD MANUAL: 1.1 10E3/UL (ref 0.8–5.3)
LYMPHOCYTES NFR BLD MANUAL: 9 %
MAGNESIUM SERPL-MCNC: 2.2 MG/DL (ref 1.7–2.3)
MCH RBC QN AUTO: 29.6 PG (ref 26.5–33)
MCHC RBC AUTO-ENTMCNC: 34.1 G/DL (ref 31.5–36.5)
MCV RBC AUTO: 87 FL (ref 78–100)
MONOCYTES # BLD MANUAL: 1.6 10E3/UL (ref 0–1.3)
MONOCYTES NFR BLD MANUAL: 14 %
NEUTROPHILS # BLD MANUAL: 9 10E3/UL (ref 1.6–8.3)
NEUTROPHILS NFR BLD MANUAL: 77 %
NT-PROBNP SERPL-MCNC: 343 PG/ML (ref 0–900)
O2/TOTAL GAS SETTING VFR VENT: 21 %
OXYHGB MFR BLDA: 90 % (ref 92–100)
P AXIS - MUSE: 52 DEGREES
PCO2 BLD: 37 MM HG (ref 35–45)
PH BLD: 7.46 [PH] (ref 7.35–7.45)
PLAT MORPH BLD: ABNORMAL
PLATELET # BLD AUTO: 386 10E3/UL (ref 150–450)
PO2 BLD: 57 MM HG (ref 80–105)
POTASSIUM SERPL-SCNC: 3.8 MMOL/L (ref 3.4–5.3)
PR INTERVAL - MUSE: 128 MS
PROT SERPL-MCNC: 8 G/DL (ref 6.4–8.3)
QRS DURATION - MUSE: 88 MS
QT - MUSE: 344 MS
QTC - MUSE: 452 MS
R AXIS - MUSE: -76 DEGREES
RBC # BLD AUTO: 4.73 10E6/UL (ref 4.4–5.9)
RBC MORPH BLD: ABNORMAL
RSV RNA SPEC NAA+PROBE: NEGATIVE
SAO2 % BLDA: 91.6 % (ref 95–96)
SARS-COV-2 RNA RESP QL NAA+PROBE: NEGATIVE
SODIUM SERPL-SCNC: 134 MMOL/L (ref 135–145)
SYSTOLIC BLOOD PRESSURE - MUSE: NORMAL MMHG
T AXIS - MUSE: 76 DEGREES
TROPONIN T SERPL HS-MCNC: 10 NG/L
TROPONIN T SERPL HS-MCNC: 9 NG/L
TSH SERPL DL<=0.005 MIU/L-ACNC: 0.54 UIU/ML (ref 0.3–4.2)
VENTRICULAR RATE- MUSE: 104 BPM
WBC # BLD AUTO: 11.7 10E3/UL (ref 4–11)

## 2025-01-09 PROCEDURE — 83605 ASSAY OF LACTIC ACID: CPT | Performed by: PHYSICIAN ASSISTANT

## 2025-01-09 PROCEDURE — 84155 ASSAY OF PROTEIN SERUM: CPT | Performed by: PHYSICIAN ASSISTANT

## 2025-01-09 PROCEDURE — 85007 BL SMEAR W/DIFF WBC COUNT: CPT | Performed by: PHYSICIAN ASSISTANT

## 2025-01-09 PROCEDURE — 86140 C-REACTIVE PROTEIN: CPT | Performed by: PHYSICIAN ASSISTANT

## 2025-01-09 PROCEDURE — 71045 X-RAY EXAM CHEST 1 VIEW: CPT

## 2025-01-09 PROCEDURE — 85379 FIBRIN DEGRADATION QUANT: CPT | Performed by: PHYSICIAN ASSISTANT

## 2025-01-09 PROCEDURE — 99284 EMERGENCY DEPT VISIT MOD MDM: CPT | Performed by: PHYSICIAN ASSISTANT

## 2025-01-09 PROCEDURE — 85018 HEMOGLOBIN: CPT | Performed by: PHYSICIAN ASSISTANT

## 2025-01-09 PROCEDURE — 36600 WITHDRAWAL OF ARTERIAL BLOOD: CPT | Performed by: PHYSICIAN ASSISTANT

## 2025-01-09 PROCEDURE — 96375 TX/PRO/DX INJ NEW DRUG ADDON: CPT | Performed by: PHYSICIAN ASSISTANT

## 2025-01-09 PROCEDURE — 99285 EMERGENCY DEPT VISIT HI MDM: CPT | Mod: 25 | Performed by: PHYSICIAN ASSISTANT

## 2025-01-09 PROCEDURE — 36415 COLL VENOUS BLD VENIPUNCTURE: CPT | Performed by: PHYSICIAN ASSISTANT

## 2025-01-09 PROCEDURE — 84443 ASSAY THYROID STIM HORMONE: CPT | Performed by: PHYSICIAN ASSISTANT

## 2025-01-09 PROCEDURE — 250N000011 HC RX IP 250 OP 636: Performed by: PHYSICIAN ASSISTANT

## 2025-01-09 PROCEDURE — 258N000003 HC RX IP 258 OP 636: Performed by: PHYSICIAN ASSISTANT

## 2025-01-09 PROCEDURE — 94640 AIRWAY INHALATION TREATMENT: CPT

## 2025-01-09 PROCEDURE — 96365 THER/PROPH/DIAG IV INF INIT: CPT | Mod: XU | Performed by: PHYSICIAN ASSISTANT

## 2025-01-09 PROCEDURE — 93005 ELECTROCARDIOGRAM TRACING: CPT | Performed by: PHYSICIAN ASSISTANT

## 2025-01-09 PROCEDURE — 87637 SARSCOV2&INF A&B&RSV AMP PRB: CPT | Performed by: PHYSICIAN ASSISTANT

## 2025-01-09 PROCEDURE — 93010 ELECTROCARDIOGRAM REPORT: CPT | Performed by: INTERNAL MEDICINE

## 2025-01-09 PROCEDURE — 96367 TX/PROPH/DG ADDL SEQ IV INF: CPT | Performed by: PHYSICIAN ASSISTANT

## 2025-01-09 PROCEDURE — 82805 BLOOD GASES W/O2 SATURATION: CPT | Performed by: PHYSICIAN ASSISTANT

## 2025-01-09 PROCEDURE — 85049 AUTOMATED PLATELET COUNT: CPT | Performed by: PHYSICIAN ASSISTANT

## 2025-01-09 PROCEDURE — 84460 ALANINE AMINO (ALT) (SGPT): CPT | Performed by: PHYSICIAN ASSISTANT

## 2025-01-09 PROCEDURE — 999N000157 HC STATISTIC RCP TIME EA 10 MIN

## 2025-01-09 PROCEDURE — 71260 CT THORAX DX C+: CPT

## 2025-01-09 PROCEDURE — 87040 BLOOD CULTURE FOR BACTERIA: CPT | Performed by: PHYSICIAN ASSISTANT

## 2025-01-09 PROCEDURE — 84520 ASSAY OF UREA NITROGEN: CPT | Performed by: PHYSICIAN ASSISTANT

## 2025-01-09 PROCEDURE — 83735 ASSAY OF MAGNESIUM: CPT | Performed by: PHYSICIAN ASSISTANT

## 2025-01-09 PROCEDURE — 250N000009 HC RX 250: Performed by: PHYSICIAN ASSISTANT

## 2025-01-09 PROCEDURE — 84484 ASSAY OF TROPONIN QUANT: CPT | Performed by: PHYSICIAN ASSISTANT

## 2025-01-09 PROCEDURE — 83880 ASSAY OF NATRIURETIC PEPTIDE: CPT | Performed by: PHYSICIAN ASSISTANT

## 2025-01-09 RX ORDER — IPRATROPIUM BROMIDE AND ALBUTEROL SULFATE 2.5; .5 MG/3ML; MG/3ML
3 SOLUTION RESPIRATORY (INHALATION) ONCE
Status: COMPLETED | OUTPATIENT
Start: 2025-01-09 | End: 2025-01-09

## 2025-01-09 RX ORDER — AZITHROMYCIN 500 MG/5ML
500 INJECTION, POWDER, LYOPHILIZED, FOR SOLUTION INTRAVENOUS EVERY 24 HOURS
Status: DISCONTINUED | OUTPATIENT
Start: 2025-01-09 | End: 2025-01-09 | Stop reason: HOSPADM

## 2025-01-09 RX ORDER — METHYLPREDNISOLONE SODIUM SUCCINATE 125 MG/2ML
125 INJECTION INTRAMUSCULAR; INTRAVENOUS ONCE
Status: COMPLETED | OUTPATIENT
Start: 2025-01-09 | End: 2025-01-09

## 2025-01-09 RX ORDER — IOPAMIDOL 755 MG/ML
90 INJECTION, SOLUTION INTRAVASCULAR ONCE
Status: COMPLETED | OUTPATIENT
Start: 2025-01-09 | End: 2025-01-09

## 2025-01-09 RX ORDER — CEFTRIAXONE 1 G/1
1 INJECTION, POWDER, FOR SOLUTION INTRAMUSCULAR; INTRAVENOUS ONCE
Status: COMPLETED | OUTPATIENT
Start: 2025-01-09 | End: 2025-01-09

## 2025-01-09 RX ORDER — BUDESONIDE 0.5 MG/2ML
0.5 INHALANT ORAL ONCE
Status: COMPLETED | OUTPATIENT
Start: 2025-01-09 | End: 2025-01-09

## 2025-01-09 RX ADMIN — SODIUM CHLORIDE 500 ML: 9 INJECTION, SOLUTION INTRAVENOUS at 15:11

## 2025-01-09 RX ADMIN — METHYLPREDNISOLONE SODIUM SUCCINATE 125 MG: 125 INJECTION, POWDER, FOR SOLUTION INTRAMUSCULAR; INTRAVENOUS at 13:36

## 2025-01-09 RX ADMIN — BUDESONIDE INHALATION 0.5 MG: 0.5 SUSPENSION RESPIRATORY (INHALATION) at 13:07

## 2025-01-09 RX ADMIN — IPRATROPIUM BROMIDE AND ALBUTEROL SULFATE 3 ML: .5; 3 SOLUTION RESPIRATORY (INHALATION) at 13:06

## 2025-01-09 RX ADMIN — IOPAMIDOL 90 ML: 755 INJECTION, SOLUTION INTRAVENOUS at 14:01

## 2025-01-09 RX ADMIN — CEFTRIAXONE SODIUM 1 G: 1 INJECTION, POWDER, FOR SOLUTION INTRAMUSCULAR; INTRAVENOUS at 13:37

## 2025-01-09 RX ADMIN — AZITHROMYCIN MONOHYDRATE 500 MG: 500 INJECTION, POWDER, LYOPHILIZED, FOR SOLUTION INTRAVENOUS at 14:29

## 2025-01-09 RX ADMIN — SODIUM CHLORIDE 60 ML: 9 INJECTION, SOLUTION INTRAVENOUS at 14:01

## 2025-01-09 ASSESSMENT — ACTIVITIES OF DAILY LIVING (ADL)
ADLS_ACUITY_SCORE: 51

## 2025-01-09 ASSESSMENT — COLUMBIA-SUICIDE SEVERITY RATING SCALE - C-SSRS
6. HAVE YOU EVER DONE ANYTHING, STARTED TO DO ANYTHING, OR PREPARED TO DO ANYTHING TO END YOUR LIFE?: NO
2. HAVE YOU ACTUALLY HAD ANY THOUGHTS OF KILLING YOURSELF IN THE PAST MONTH?: NO
1. IN THE PAST MONTH, HAVE YOU WISHED YOU WERE DEAD OR WISHED YOU COULD GO TO SLEEP AND NOT WAKE UP?: NO

## 2025-01-09 NOTE — DISCHARGE INSTRUCTIONS
-X-rays and CT imaging today showed pneumonia.  You were negative for COVID, influenza, and RSV.  -Starting this evening, take Augmentin every 12 hours for 10 days for treatment of pneumonia  -Continue with your at home inhalers for cough.  -Over the next 24 to 48 hours, if you have worsening shortness of breath, chest pain, fever, if your oxygen levels drop below 90%, you become lightheaded, or if you have any other concerning symptom, please return to the ER for reevaluation and probable admission.  -Recommend following up with your primary care doctor within the next week for reevaluation.

## 2025-01-09 NOTE — ED TRIAGE NOTES
"ED Nursing Triage Note (General)   ________________________________    Jayy Cole is a 74 year old Male that presents to triage via private vehicle with family for complaints of increased SOB and cough.  Family states patient began having sx a week ago and state over the past 48 hours patients sx have worsened.  Over the past 48hrs family states patient has had decreased appetite and increased weakness. Daughter states patient has a hx of COPD, however, is not oxygen dependent.  Patient has remained afebrile since sx began.  Patient denies body ahces, chills, N/V/D.   Significant symptoms had onset 1 week(s) ago.  Vital signs:  Temp: 99.2  F (37.3  C) Temp src: Tympanic BP: 109/67 Pulse: 107   Resp: 24 SpO2: 92 %     Height: 172.7 cm (5' 8\") Weight: 54.4 kg (120 lb)  Estimated body mass index is 18.25 kg/m  as calculated from the following:    Height as of this encounter: 1.727 m (5' 8\").    Weight as of this encounter: 54.4 kg (120 lb).  PRE HOSPITAL PRIOR LIVING SITUATION-Assisted Living     Triage Assessment (Adult)       Row Name 01/09/25 1017          Triage Assessment    Airway WDL WDL        Respiratory WDL    Respiratory WDL X;cough        Skin Circulation/Temperature WDL    Skin Circulation/Temperature WDL WDL        Cardiac WDL    Cardiac WDL WDL        Peripheral/Neurovascular WDL    Peripheral Neurovascular WDL WDL                     "

## 2025-01-10 NOTE — ED PROVIDER NOTES
EMERGENCY DEPARTMENT ENCOUNTER      NAME: Jayy Cole  AGE: 74 year old male  YOB: 1950  MRN: [id number]  EVALUATION DATE & TIME: 1/9/2025 10:55 AM    PCP: Sonali Reynoso    ED PROVIDER: Chacho Gallego PA-C       CHIEF COMPLAINT:  Chief Complaint   Patient presents with    Shortness of Breath    Cough         HPI  Jayy Cole is a pleasant 74 year old male complaining of shortness of breath and cough.  History of COPD.  No oxygen requirements.  Patient been having cough about a week ago it appears that worsened over the past 48 hours.  Patient is feeling more fatigued and decreased appetite.  Denies fever.  No nausea, vomiting, diarrhea, chills, joint pain, or bodyaches.      REVIEW OF SYSTEMS   Review of Systems  As above, otherwise ROS is unremarkable.      PAST MEDICAL HISTORY:  No past medical history on file.      PAST SURGICAL HISTORY:  Past Surgical History:   Procedure Laterality Date    COLONOSCOPY      2006    CYSTOSCOPY      05/12,surveillance    OTHER SURGICAL HISTORY      2008,,HERNIA REPAIR,incarcerated    OTHER SURGICAL HISTORY      2011,[id number],OTHER         CURRENT MEDICATIONS:    Current Outpatient Medications   Medication Instructions    acetaminophen (TYLENOL) 1,000 mg, Oral, 2 TIMES DAILY    albuterol (PROAIR HFA/PROVENTIL HFA/VENTOLIN HFA) 108 (90 Base) MCG/ACT inhaler 2 puffs, Inhalation, 2 TIMES DAILY    alendronate (FOSAMAX) 70 mg, Oral, EVERY 7 DAYS, - takes on Fridays    amoxicillin-clavulanate (AUGMENTIN) 875-125 MG tablet 1 tablet, Oral, 2 TIMES DAILY    aspirin 81 mg, Oral, DAILY    atorvastatin (LIPITOR) 40 mg, Oral, AT BEDTIME    clopidogrel (PLAVIX) 75 mg, Oral, EVERY MORNING    DM-Doxylamine-Acetaminophen (VICKS NYQUIL COLD & FLU) 15-6. MG/15ML LIQD Per directions on over-the-counter bottle.  Patient to use as needed for nighttime cough.    fluticasone-salmeterol (ADVAIR) 500-50 MCG/ACT inhaler 1 puff, Inhalation, EVERY 12 HOURS     ibuprofen (ADVIL/MOTRIN) 600 mg, Oral, 3 TIMES DAILY PRN    latanoprost (XALATAN) 0.005 % ophthalmic solution 1 drop, Both Eyes, DAILY    nicotine (NICORETTE) 4 mg, Buccal, EVERY 1 HOUR PRN    QUEtiapine (SEROQUEL) 300 mg, Oral, AT BEDTIME         ALLERGIES:  No Known Allergies      FAMILY HISTORY:  Family History   Problem Relation Age of Onset    Cancer Mother         Cancer,Stomach cancer    Heart Disease Father         Heart Disease         SOCIAL HISTORY:   Social History     Socioeconomic History    Marital status: Single   Tobacco Use    Smoking status: Every Day     Current packs/day: 4.00     Average packs/day: 4.0 packs/day for 56.0 years (224.0 ttl pk-yrs)     Types: Cigarettes     Passive exposure: Current    Smokeless tobacco: Never   Vaping Use    Vaping status: Never Used   Substance and Sexual Activity    Alcohol use: No    Drug use: Not Currently     Comment: Drug use: Not Asked    Sexual activity: Not Currently   Social History Narrative    Social history reveals he is single.  He does not have any children.  He has worked in the past at a number of different jobs.  Currently he is working as a .  He indicates that he smokes up to six packages of cigarettes a day.     Social Drivers of Health     Financial Resource Strain: Low Risk  (6/7/2024)    Financial Resource Strain     Within the past 12 months, have you or your family members you live with been unable to get utilities (heat, electricity) when it was really needed?: No   Food Insecurity: Low Risk  (6/7/2024)    Food Insecurity     Within the past 12 months, did you worry that your food would run out before you got money to buy more?: No     Within the past 12 months, did the food you bought just not last and you didn t have money to get more?: No   Transportation Needs: Low Risk  (6/7/2024)    Transportation Needs     Within the past 12 months, has lack of transportation kept you from medical appointments, getting your medicines,  "non-medical meetings or appointments, work, or from getting things that you need?: No   Physical Activity: Unknown (6/7/2024)    Exercise Vital Sign     Days of Exercise per Week: 5 days   Stress: Stress Concern Present (6/7/2024)    [employer] [employer] - Occupational Stress Questionnaire     Feeling of Stress : To some extent   Social Connections: Unknown (6/7/2024)    Social Connection and Isolation Panel [NHANES]     Frequency of Social Gatherings with Friends and Family: Once a week   Interpersonal Safety: Low Risk  (6/7/2024)    Interpersonal Safety     Do you feel physically and emotionally safe where you currently live?: Yes     Within the past 12 months, have you been hit, slapped, kicked or otherwise physically hurt by someone?: No     Within the past 12 months, have you been humiliated or emotionally abused in other ways by your partner or ex-partner?: No   Housing Stability: Low Risk  (6/7/2024)    Housing Stability     Do you have housing? : Yes     Are you worried about losing your housing?: No   Recent Concern: Housing Stability - High Risk (5/6/2024)    Housing Stability     Do you have housing? : No     Are you worried about losing your housing?: No       ==================================================================================================================================    PHYSICAL EXAM    VITAL SIGNS: /82   Pulse 91   Temp 99.2  F (37.3  C) (Tympanic)   Resp 24   Ht 1.727 m (5' 8\")   Wt 54.4 kg (120 lb)   SpO2 100%   BMI 18.25 kg/m      Patient Vitals for the past 24 hrs:   BP Temp Temp src Pulse Resp SpO2 Height Weight   01/09/25 1600 128/82 -- -- 91 -- 100 % -- --   01/09/25 1545 128/78 -- -- 94 -- 91 % -- --   01/09/25 1515 124/82 -- -- 99 -- 93 % -- --   01/09/25 1500 129/88 -- -- 97 -- 91 % -- --   01/09/25 1445 120/86 -- -- 100 -- 92 % -- --   01/09/25 1430 119/76 -- -- -- -- 92 % -- --   01/09/25 1415 137/87 -- -- 99 -- 94 % -- --   01/09/25 " "1345 130/87 -- -- 105 -- 92 % -- --   01/09/25 1330 (!) 129/91 -- -- 101 -- 91 % -- --   01/09/25 1315 127/90 -- -- 112 -- 92 % -- --   01/09/25 1307 -- -- -- -- -- 92 % -- --   01/09/25 1300 130/77 -- -- 94 -- 92 % -- --   01/09/25 1245 122/81 -- -- 100 -- 91 % -- --   01/09/25 1200 121/83 -- -- 101 -- 93 % -- --   01/09/25 1020 109/67 99.2  F (37.3  C) Tympanic 107 24 92 % 1.727 m (5' 8\") 54.4 kg (120 lb)       Physical Exam  Vitals and nursing note reviewed.   Constitutional:       General: He is not in acute distress.     Appearance: Normal appearance. He is not ill-appearing or diaphoretic.      Comments: Patient was fairly well-appearing.  Speaking normally.  Not diaphoretic.  Not ill-appearing or toxic   HENT:      Nose: Nose normal.      Mouth/Throat:      Mouth: Mucous membranes are moist.      Pharynx: Oropharynx is clear.   Eyes:      Conjunctiva/sclera: Conjunctivae normal.   Cardiovascular:      Rate and Rhythm: Regular rhythm. Tachycardia present.      Pulses: Normal pulses.      Heart sounds: Normal heart sounds.   Pulmonary:      Effort: Pulmonary effort is normal.      Breath sounds: Wheezing (Expiratory bilateral wheezing) present.   Abdominal:      General: Abdomen is flat.      Palpations: Abdomen is soft.      Tenderness: There is no abdominal tenderness.   Musculoskeletal:         General: Normal range of motion.      Cervical back: Normal range of motion and neck supple.      Right lower leg: No edema.      Left lower leg: No edema.   Skin:     General: Skin is warm and dry.   Neurological:      General: No focal deficit present.      Mental Status: He is alert.   Psychiatric:         Mood and Affect: Mood normal.            ==================================================================================================================================    LABS & RADIOLOGY:  All pertinent labs reviewed and interpreted. Reviewed all pertinent imaging. Please see official radiology " report.  Results for orders placed or performed during the hospital encounter of 01/09/25   XR Chest Port 1 View    Impression    IMPRESSION:  Possible multifocal bronchopneumonia superimposed on  chronic fibrosis/emphysema. Recommend careful follow-up to resolution.       CHLOE ROJO MD         SYSTEM ID:  [id number]   CT Chest w Contrast    Impression    IMPRESSION:    Prominently tree-in-bud broadened branching pulmonary nodularity  suggests multifocal bronchopneumonia. Careful delayed follow-up to  resolution is requested.    CHLOE ROJO MD         SYSTEM ID:  [id number]   Lactic acid whole blood with 1x repeat in 2 hr when >2   Result Value Ref Range    Lactic Acid, Initial 1.5 0.7 - 2.0 mmol/L   Result Value Ref Range    Troponin T, High Sensitivity 10 <=22 ng/L   Result Value Ref Range    Magnesium 2.2 1.7 - 2.3 mg/dL   TSH Reflex GH   Result Value Ref Range    TSH 0.54 0.30 - 4.20 uIU/mL   Blood gas arterial   Result Value Ref Range    pH Arterial 7.46 (H) 7.35 - 7.45    pCO2 Arterial 37 35 - 45 mm Hg    pO2 Arterial 57 (L) 80 - 105 mm Hg    FIO2 21     Bicarbonate Arterial 26 21 - 28 mmol/L    Base Excess/Deficit Arterial 2.4 -3.0 - 3.0 mmol/L    Tello's Test No     Oxyhemoglobin Arterial 90 (L) 92 - 100 %    O2 Sat, Arterial 91.6 (L) 95.0 - 96.0 %   Result Value Ref Range    CRP Inflammation 173.19 (H) <5.00 mg/L   Nt probnp inpatient (BNP)   Result Value Ref Range    N terminal Pro BNP Inpatient 343 0 - 900 pg/mL   Influenza A/B, RSV and SARS-CoV2 PCR (COVID-19) Nose    Specimen: Nose; Swab   Result Value Ref Range    Influenza A PCR Negative Negative    Influenza B PCR Negative Negative    RSV PCR Negative Negative    SARS CoV2 PCR Negative Negative   Comprehensive metabolic panel   Result Value Ref Range    Sodium 134 (L) 135 - 145 mmol/L    Potassium 3.8 3.4 - 5.3 mmol/L    Carbon Dioxide (CO2) 24 22 - 29 mmol/L    Anion Gap 14 7 - 15 mmol/L    Urea Nitrogen 17.2 8.0 - 23.0 mg/dL     Creatinine 0.65 (L) 0.67 - 1.17 mg/dL    GFR Estimate >90 >60 mL/min/1.73m2    Calcium 8.5 (L) 8.8 - 10.4 mg/dL    Chloride 96 (L) 98 - 107 mmol/L    Glucose 136 (H) 70 - 99 mg/dL    Alkaline Phosphatase 84 40 - 150 U/L    AST 36 0 - 45 U/L    ALT 36 0 - 70 U/L    Protein Total 8.0 6.4 - 8.3 g/dL    Albumin 3.6 3.5 - 5.2 g/dL    Bilirubin Total 0.6 <=1.2 mg/dL   CBC with platelets and differential   Result Value Ref Range    WBC Count 11.7 (H) 4.0 - 11.0 10e3/uL    RBC Count 4.73 4.40 - 5.90 10e6/uL    Hemoglobin 14.0 13.3 - 17.7 g/dL    Hematocrit 41.1 40.0 - 53.0 %    MCV 87 78 - 100 fL    MCH 29.6 26.5 - 33.0 pg    MCHC 34.1 31.5 - 36.5 g/dL    RDW 14.4 10.0 - 15.0 %    Platelet Count 386 150 - 450 10e3/uL   Extra Blue Top Tube   Result Value Ref Range    Hold Specimen JIC    Extra Red Top Tube   Result Value Ref Range    Hold Specimen x    D dimer quantitative   Result Value Ref Range    D-Dimer Quantitative 0.65 (H) 0.00 - 0.50 ug/mL FEU   Manual Differential   Result Value Ref Range    % Neutrophils 77 %    % Lymphocytes 9 %    % Monocytes 14 %    % Eosinophils 0 %    % Basophils 0 %    Absolute Neutrophils 9.0 (H) 1.6 - 8.3 10e3/uL    Absolute Lymphocytes 1.1 0.8 - 5.3 10e3/uL    Absolute Monocytes 1.6 (H) 0.0 - 1.3 10e3/uL    Absolute Eosinophils 0.0 0.0 - 0.7 10e3/uL    Absolute Basophils 0.0 0.0 - 0.2 10e3/uL    RBC Morphology Confirmed RBC Indices     Platelet Assessment  Automated Count Confirmed. Platelet morphology is normal.     Automated Count Confirmed. Platelet morphology is normal.   Result Value Ref Range    Troponin T, High Sensitivity 9 <=22 ng/L   Lactic Acid Whole Blood with 1X Repeat in 2 HR when >2   Result Value Ref Range    Lactic Acid, Initial 1.6 0.7 - 2.0 mmol/L   EKG 12-lead, tracing only   Result Value Ref Range    Systolic Blood Pressure  mmHg    Diastolic Blood Pressure  mmHg    Ventricular Rate 104 BPM    Atrial Rate 104 BPM    OK Interval 128 ms    QRS Duration 88 ms     ms  "   QTc 452 ms    P Axis 52 degrees    R AXIS -76 degrees    T Axis 76 degrees    Interpretation ECG       Sinus tachycardia with Premature supraventricular complexes  Left axis deviation  Abnormal ECG  comparison unavailable  Confirmed by MD FRANCISCO, [facility] ([id number]) on 1/9/2025 2:32:57 PM       CT Chest w Contrast   Final Result   IMPRESSION:      Prominently tree-in-bud broadened branching pulmonary nodularity   suggests multifocal bronchopneumonia. Careful delayed follow-up to   resolution is requested.      CHLOE ROJO MD            SYSTEM ID:  [id number]      XR Chest Port 1 View   Final Result   IMPRESSION:  Possible multifocal bronchopneumonia superimposed on   chronic fibrosis/emphysema. Recommend careful follow-up to resolution.          CHLOE ROJO MD            SYSTEM ID:  [id number]              ==================================================================================================================================    ED COURSE, MEDICAL DECISION MAKING, FINAL IMPRESSION AND PLAN:     Assessment / Plan:  1. Pneumonia    2. COPD (chronic obstructive pulmonary disease) (H)        The patient was interviewed and examined.  HPI and physical exam as below.  Differential diagnosis and MDM Key Documentation Elements as below.  Vitals, triage note, and nursing notes were reviewed.  /82   Pulse 91   Temp 99.2  F (37.3  C) (Tympanic)   Resp 24   Ht 1.727 m (5' 8\")   Wt 54.4 kg (120 lb)   SpO2 100%   BMI 18.25 kg/m        Patient was afebrile and normotensive.  Patient was slightly tachycardic at 107.  Patient was in no acute distress.  Patient was well-appearing.  Patient with slight expiratory wheezing bilaterally.  Differential includes COPD exacerbation versus pneumonia versus PE.  Remaining physical exam otherwise negative for objective findings.    EKG showed sinus tachycardia DVTs but no ST segment deviation suggest ACS/MI.  Screening troponin and BNP were nonelevated, less " likely ACS/MI or acute heart failure.  COVID, influenza, RSV were negative.  Patient with mild leukocytosis 11,700.  No anemia.  Normal lactic acid.  CRP elevated 173.19.  Sodium 134.  Creatinine 0.65.  Glucose 136.  D-dimer not elevated when adjusted for age.  Remaining laboratory studies otherwise reassuring.  Chest x-ray showed possible multifocal bronchopneumonia.  CT chest with contrast showed privately tree-in-bud broadened branching pulmonary nodularities suggestive of multifocal bronchopneumonia.    Patient was given IV Solu-Medrol, DuoNebs, and Pulmicort for wheezing with improvement in cough and wheezing.  Patient was given IV Rocephin and IV Theramycin for treatment of pneumonia.  Patient given 500 cc of normal saline.  Tachycardia resolved.  He was not hypoxic during his course of stay do not require submental oxygen.  Blood pressures remained stable and patient was not hypotensive.  Tachycardia resolved.  Overall patient feeling well.  Patient not septic on appearance.  After discussion with patient, patient wants to go home.  Will do Augmentin every 12 hours for 10 days.  Follow-up primary care.  Return to the ER for any new or worsening symptoms.  Patient discharged home in stable condition peer    Pertinent Labs & Imaging studies reviewed. (See chart for details)  Results for orders placed or performed during the hospital encounter of 01/09/25   XR Chest Port 1 View    Impression    IMPRESSION:  Possible multifocal bronchopneumonia superimposed on  chronic fibrosis/emphysema. Recommend careful follow-up to resolution.       CHLOE ROJO MD         SYSTEM ID:  [id number]   CT Chest w Contrast    Impression    IMPRESSION:    Prominently tree-in-bud broadened branching pulmonary nodularity  suggests multifocal bronchopneumonia. Careful delayed follow-up to  resolution is requested.    CHLOE ROJO MD         SYSTEM ID:  [id number]   Lactic acid whole blood with 1x repeat in 2 hr when >2    Result Value Ref Range    Lactic Acid, Initial 1.5 0.7 - 2.0 mmol/L   Result Value Ref Range    Troponin T, High Sensitivity 10 <=22 ng/L   Result Value Ref Range    Magnesium 2.2 1.7 - 2.3 mg/dL   TSH Reflex GH   Result Value Ref Range    TSH 0.54 0.30 - 4.20 uIU/mL   Blood gas arterial   Result Value Ref Range    pH Arterial 7.46 (H) 7.35 - 7.45    pCO2 Arterial 37 35 - 45 mm Hg    pO2 Arterial 57 (L) 80 - 105 mm Hg    FIO2 21     Bicarbonate Arterial 26 21 - 28 mmol/L    Base Excess/Deficit Arterial 2.4 -3.0 - 3.0 mmol/L    Tello's Test No     Oxyhemoglobin Arterial 90 (L) 92 - 100 %    O2 Sat, Arterial 91.6 (L) 95.0 - 96.0 %   Result Value Ref Range    CRP Inflammation 173.19 (H) <5.00 mg/L   Nt probnp inpatient (BNP)   Result Value Ref Range    N terminal Pro BNP Inpatient 343 0 - 900 pg/mL   Influenza A/B, RSV and SARS-CoV2 PCR (COVID-19) Nose    Specimen: Nose; Swab   Result Value Ref Range    Influenza A PCR Negative Negative    Influenza B PCR Negative Negative    RSV PCR Negative Negative    SARS CoV2 PCR Negative Negative   Comprehensive metabolic panel   Result Value Ref Range    Sodium 134 (L) 135 - 145 mmol/L    Potassium 3.8 3.4 - 5.3 mmol/L    Carbon Dioxide (CO2) 24 22 - 29 mmol/L    Anion Gap 14 7 - 15 mmol/L    Urea Nitrogen 17.2 8.0 - 23.0 mg/dL    Creatinine 0.65 (L) 0.67 - 1.17 mg/dL    GFR Estimate >90 >60 mL/min/1.73m2    Calcium 8.5 (L) 8.8 - 10.4 mg/dL    Chloride 96 (L) 98 - 107 mmol/L    Glucose 136 (H) 70 - 99 mg/dL    Alkaline Phosphatase 84 40 - 150 U/L    AST 36 0 - 45 U/L    ALT 36 0 - 70 U/L    Protein Total 8.0 6.4 - 8.3 g/dL    Albumin 3.6 3.5 - 5.2 g/dL    Bilirubin Total 0.6 <=1.2 mg/dL   CBC with platelets and differential   Result Value Ref Range    WBC Count 11.7 (H) 4.0 - 11.0 10e3/uL    RBC Count 4.73 4.40 - 5.90 10e6/uL    Hemoglobin 14.0 13.3 - 17.7 g/dL    Hematocrit 41.1 40.0 - 53.0 %    MCV 87 78 - 100 fL    MCH 29.6 26.5 - 33.0 pg    MCHC 34.1 31.5 - 36.5 g/dL     "RDW 14.4 10.0 - 15.0 %    Platelet Count 386 150 - 450 10e3/uL   Extra Blue Top Tube   Result Value Ref Range    Hold Specimen JIC    Extra Red Top Tube   Result Value Ref Range    Hold Specimen x    D dimer quantitative   Result Value Ref Range    D-Dimer Quantitative 0.65 (H) 0.00 - 0.50 ug/mL FEU   Manual Differential   Result Value Ref Range    % Neutrophils 77 %    % Lymphocytes 9 %    % Monocytes 14 %    % Eosinophils 0 %    % Basophils 0 %    Absolute Neutrophils 9.0 (H) 1.6 - 8.3 10e3/uL    Absolute Lymphocytes 1.1 0.8 - 5.3 10e3/uL    Absolute Monocytes 1.6 (H) 0.0 - 1.3 10e3/uL    Absolute Eosinophils 0.0 0.0 - 0.7 10e3/uL    Absolute Basophils 0.0 0.0 - 0.2 10e3/uL    RBC Morphology Confirmed RBC Indices     Platelet Assessment  Automated Count Confirmed. Platelet morphology is normal.     Automated Count Confirmed. Platelet morphology is normal.   Result Value Ref Range    Troponin T, High Sensitivity 9 <=22 ng/L   Lactic Acid Whole Blood with 1X Repeat in 2 HR when >2   Result Value Ref Range    Lactic Acid, Initial 1.6 0.7 - 2.0 mmol/L   EKG 12-lead, tracing only   Result Value Ref Range    Systolic Blood Pressure  mmHg    Diastolic Blood Pressure  mmHg    Ventricular Rate 104 BPM    Atrial Rate 104 BPM    MS Interval 128 ms    QRS Duration 88 ms     ms    QTc 452 ms    P Axis 52 degrees    R AXIS -76 degrees    T Axis 76 degrees    Interpretation ECG       Sinus tachycardia with Premature supraventricular complexes  Left axis deviation  Abnormal ECG  comparison unavailable  Confirmed by MD FRANCISCO, NATI ([id number]) on 1/9/2025 2:32:57 PM       No results found for: \"ABORH\"      Reassessments, Medications, Interventions, & Response to Treatments:  -as above    Medications given during today's ER visit:  Medications   ipratropium - albuterol 0.5 mg/2.5 mg/3 mL (DUONEB) neb solution 3 mL (3 mLs Nebulization $Given 1/9/25 1306)   methylPREDNISolone Na Suc (solu-MEDROL) injection 125 mg (125 mg " Intravenous $Given 1/9/25 1336)   budesonide (PULMICORT) neb solution 0.5 mg (0.5 mg Nebulization $Given 1/9/25 1307)   cefTRIAXone (ROCEPHIN) 1 g vial to attach to  mL bag for ADULTS or NS 50 mL bag for PEDS (0 g Intravenous Stopped 1/9/25 1427)   iopamidol (ISOVUE-370) solution 90 mL (90 mLs Intravenous $Given 1/9/25 1401)   sodium chloride 0.9 % bag 500 mL for CT scan flush use (60 mLs Intravenous $Given 1/9/25 1401)   sodium chloride 0.9% BOLUS 500 mL (0 mLs Intravenous Stopped 1/9/25 1604)       Consultations:  None    Decision Rules, Medical Calculators, and Risk Stratification Tools:  None    MDM Key Documentation Elements for Patient's Evaluation:  Differential diagnosis to include high risk considerations: As above  Escalation to admission/observation considered: Admission/observation considered, patient wants to go home.  Discussions and management with other clinicians:    3a. Independent interpretation of testing performed by another health professional:  -No  3b. Discussion of management or test interpretation with another health professional: -No  Independent interpretation of tests:  Ordering and/or review of 3= test(s)  Discussion of test interpretations with radiology:  No  History obtained from source other than patient or assessment requiring an independent historian:  No  Review of non-ED/external records:  review of 3+ records  Diagnostic tests considered but not ultimately performed/deferred:  -CTA chest PE study  Prescription medications considered but not prescribed:  -Z-Parminder  Chronic conditions affecting care:  -COPD  Care affected by social determinants of health:  -None    The patient's management involved:   - Laboratory studies  - Imaging studies  - Prescription drug management  - Parenteral controlled substance      A shared decision making model was used. Time was taken to answer all questions.  Patient and/or associated parties understood and were agreeable to treatment plan.   Plan and all results were discussed. Warning signs and close return precautions to return to the ED given. Copy of results given. Discharged in stable condition. Discharged with discharge instructions outlining plan for further care and follow up.      New prescriptions started at today's ER visit  Discharge Medication List as of 1/9/2025  4:04 PM        START taking these medications    Details   amoxicillin-clavulanate (AUGMENTIN) 875-125 MG tablet Take 1 tablet by mouth 2 times daily., Disp-20 tablet, R-0, [facility]             ==================================================================================================================================      I, Mauri Gallego PA-C, personally performed the services described in this documentation, and it is both accurate and complete.       Chacho Gallego PA-C  01/09/25 [id number]

## 2025-01-14 ENCOUNTER — MEDICAL CORRESPONDENCE (OUTPATIENT)
Dept: HEALTH INFORMATION MANAGEMENT | Facility: OTHER | Age: 75
End: 2025-01-14
Payer: MEDICARE

## 2025-01-16 LAB — BACTERIA BLD CULT: NO GROWTH

## 2025-01-23 ENCOUNTER — OFFICE VISIT (OUTPATIENT)
Dept: FAMILY MEDICINE | Facility: OTHER | Age: 75
End: 2025-01-23
Attending: NURSE PRACTITIONER
Payer: MEDICARE

## 2025-01-23 VITALS
SYSTOLIC BLOOD PRESSURE: 94 MMHG | RESPIRATION RATE: 32 BRPM | HEART RATE: 88 BPM | DIASTOLIC BLOOD PRESSURE: 60 MMHG | TEMPERATURE: 98 F | BODY MASS INDEX: 18.25 KG/M2 | OXYGEN SATURATION: 95 % | WEIGHT: 120 LBS

## 2025-01-23 DIAGNOSIS — F20.9 SCHIZOPHRENIA, UNSPECIFIED TYPE (H): ICD-10-CM

## 2025-01-23 DIAGNOSIS — R06.02 SHORTNESS OF BREATH: ICD-10-CM

## 2025-01-23 DIAGNOSIS — N39.498 OTHER URINARY INCONTINENCE: ICD-10-CM

## 2025-01-23 DIAGNOSIS — Z87.891 PERSONAL HISTORY OF TOBACCO USE, PRESENTING HAZARDS TO HEALTH: ICD-10-CM

## 2025-01-23 DIAGNOSIS — J44.1 CHRONIC OBSTRUCTIVE PULMONARY DISEASE WITH ACUTE EXACERBATION (H): Primary | ICD-10-CM

## 2025-01-23 DIAGNOSIS — Z86.73 HISTORY OF STROKE: ICD-10-CM

## 2025-01-23 DIAGNOSIS — R05.1 ACUTE COUGH: ICD-10-CM

## 2025-01-23 DIAGNOSIS — I42.9 CARDIOMYOPATHY, UNSPECIFIED TYPE (H): ICD-10-CM

## 2025-01-23 PROCEDURE — G0463 HOSPITAL OUTPT CLINIC VISIT: HCPCS | Mod: 25 | Performed by: NURSE PRACTITIONER

## 2025-01-23 RX ORDER — POLYETHYLENE GLYCOL 3350 17 G
2 POWDER IN PACKET (EA) ORAL
Qty: 168 LOZENGE | Refills: 5 | Status: SHIPPED | OUTPATIENT
Start: 2025-01-23

## 2025-01-23 RX ORDER — LEVOFLOXACIN 500 MG/1
500 TABLET, FILM COATED ORAL DAILY
Qty: 7 TABLET | Refills: 0 | Status: SHIPPED | OUTPATIENT
Start: 2025-01-23 | End: 2025-01-30

## 2025-01-23 RX ORDER — PREDNISONE 20 MG/1
TABLET ORAL
Qty: 20 TABLET | Refills: 0 | Status: SHIPPED | OUTPATIENT
Start: 2025-01-23

## 2025-01-23 ASSESSMENT — PAIN SCALES - GENERAL: PAINLEVEL_OUTOF10: NO PAIN (0)

## 2025-01-23 NOTE — NURSING NOTE
Patient presents today for ED follow up.    Medication Reconciliation Complete    Jasmin Tabor LPN  1/23/2025 10:18 AM

## 2025-01-23 NOTE — PROGRESS NOTES
Assessment & Plan   Problem List Items Addressed This Visit          Respiratory    COPD (chronic obstructive pulmonary disease) (H)    Relevant Medications    nicotine (COMMIT) 2 MG lozenge    predniSONE (DELTASONE) 20 MG tablet    levofloxacin (LEVAQUIN) 500 MG tablet    Other Relevant Orders    XR Chest 2 Views (Completed)    Shortness of breath    Relevant Orders    XR Chest 2 Views (Completed)       Circulatory    Cardiomyopathy (H)       Behavioral    Schizophrenia (H)       Other    History of stroke     Other Visit Diagnoses       Acute cough    -  Primary    Relevant Medications    predniSONE (DELTASONE) 20 MG tablet    levofloxacin (LEVAQUIN) 500 MG tablet    Other Relevant Orders    XR Chest 2 Views (Completed)    Personal history of tobacco use, presenting hazards to health        Relevant Medications    nicotine (COMMIT) 2 MG lozenge    Other urinary incontinence        Relevant Orders    Incontinence Supplies Order for DME - ONLY FOR DME           Subjective pain, mild improvement in previous pneumonia.  COPD exacerbation, opted to treat with Levaquin and prednisone.  Nicotine lozenges ordered  Incontinence supplies ordered  Discussed him not taking medications for COPD, Plavix and others.  I did review the risk and benefits, asked if he understands and he says he does not.  He does not have a healthcare power of .  Staff will continue to work on encouraging take medications on a regular basis.  Discussed high risk of recurrence of stroke, cardiomyopathy, worsening schizophrenia and others.    The longitudinal plan of care for the diagnosis(es)/condition(s) as documented were addressed during this visit. Due to the added complexity in care, I will continue to support Jayy in the subsequent management and with ongoing continuity of care.     MED REC REQUIRED  Post Medication Reconciliation Status: discharge medications reconciled and changed, per note/orders      No follow-ups on  file.      Alek Hope is a 74 year old, presenting for the following health issues:  ER F/U    HPI       ED/UC Followup:    Facility:  [facility]  Date of visit: 01/09/25  Reason for visit: Pneumonia  Current Status: stable    He presents to clinic today for ED follow-up.  He is accompanied by staff from his assisted living.  He feels his cough has worsened, more shortness of breath.  They state he was taking Augmentin, cough started to improve and then worsened again.  He is not using any of his inhalers or his other medications over the past 5 to 7 days.  Staff reports that he goes in spurts of not taking meds or eating for 4 to 5 days at a time.  He does follow with [facility].  They are asking for order for briefs due to urinary incontinence as well as lozenges for smoking.  He is not interested in smoking cessation.  When asked why he does not take his medications inhalers he states he just does not like the helping.        Objective    BP 94/60   Pulse 88   Temp 98  F (36.7  C)   Resp (!) 32   Wt 54.4 kg (120 lb)   SpO2 95%   BMI 18.25 kg/m    Body mass index is 18.25 kg/m .  Physical Exam   GENERAL: alert and no distress  EYES: Eyes grossly normal to inspections  RESP: diminished breath sounds, rhonchi noted, 02 sats 95% on RA  CV: regular rate and rhythm, normal S1 S2  NEURO: Normal strength and tone, mentation intact and speech normal  PSYCH: mentation appears normal, affect normal/bright    Results for orders placed or performed during the hospital encounter of 01/23/25   XR Chest 2 Views     Status: None    Narrative    PROCEDURE: XR CHEST 2 VIEWS 1/23/2025 10:45 AM    HISTORY: Acute cough; Chronic obstructive pulmonary disease with acute  exacerbation (H); Shortness of breath    COMPARISONS: 1/9/2025.    TECHNIQUE: 2 views.    FINDINGS: Heart and pulmonary vasculature are normal. Lungs are now  clear and no pleural effusion is seen. Lungs are  somewhat  hyperinflated.    There is an azygous fissure, normal variant.         Impression    IMPRESSION: Interval improvement in the appearance of the chest with  resolution of previously seen interstitial infiltrates.    DARWIN PERKINS MD         SYSTEM ID:  [id number]               Signed Electronically by: MOUNIKA Saldana CNP

## 2025-02-02 ENCOUNTER — OFFICE VISIT (OUTPATIENT)
Dept: FAMILY MEDICINE | Facility: OTHER | Age: 75
End: 2025-02-02
Attending: REGISTERED NURSE
Payer: MEDICARE

## 2025-02-02 ENCOUNTER — HOSPITAL ENCOUNTER (OUTPATIENT)
Dept: GENERAL RADIOLOGY | Facility: OTHER | Age: 75
Discharge: HOME | End: 2025-02-02
Attending: REGISTERED NURSE
Payer: MEDICARE

## 2025-02-02 VITALS
TEMPERATURE: 98.6 F | HEIGHT: 68 IN | RESPIRATION RATE: 24 BRPM | BODY MASS INDEX: 18.49 KG/M2 | SYSTOLIC BLOOD PRESSURE: 90 MMHG | WEIGHT: 122 LBS | DIASTOLIC BLOOD PRESSURE: 60 MMHG | HEART RATE: 106 BPM | OXYGEN SATURATION: 96 %

## 2025-02-02 DIAGNOSIS — J44.1 CHRONIC OBSTRUCTIVE PULMONARY DISEASE WITH ACUTE EXACERBATION (H): Primary | ICD-10-CM

## 2025-02-02 DIAGNOSIS — R05.1 ACUTE COUGH: ICD-10-CM

## 2025-02-02 DIAGNOSIS — Z87.01 HISTORY OF PNEUMONIA: ICD-10-CM

## 2025-02-02 LAB
ANION GAP SERPL CALCULATED.3IONS-SCNC: 7 MMOL/L (ref 7–15)
BASOPHILS # BLD AUTO: 0.1 10E3/UL (ref 0–0.2)
BASOPHILS NFR BLD AUTO: 1 %
BUN SERPL-MCNC: 18 MG/DL (ref 8–23)
CALCIUM SERPL-MCNC: 8.9 MG/DL (ref 8.8–10.4)
CHLORIDE SERPL-SCNC: 104 MMOL/L (ref 98–107)
CREAT SERPL-MCNC: 0.71 MG/DL (ref 0.67–1.17)
EGFRCR SERPLBLD CKD-EPI 2021: >90 ML/MIN/1.73M2
EOSINOPHIL # BLD AUTO: 0.1 10E3/UL (ref 0–0.7)
EOSINOPHIL NFR BLD AUTO: 2 %
ERYTHROCYTE [DISTWIDTH] IN BLOOD BY AUTOMATED COUNT: 16.3 % (ref 10–15)
GLUCOSE SERPL-MCNC: 127 MG/DL (ref 70–99)
HCO3 SERPL-SCNC: 25 MMOL/L (ref 22–29)
HCT VFR BLD AUTO: 41.6 % (ref 40–53)
HGB BLD-MCNC: 14.1 G/DL (ref 13.3–17.7)
IMM GRANULOCYTES # BLD: 0.1 10E3/UL
IMM GRANULOCYTES NFR BLD: 1 %
LYMPHOCYTES # BLD AUTO: 0.7 10E3/UL (ref 0.8–5.3)
LYMPHOCYTES NFR BLD AUTO: 9 %
MCH RBC QN AUTO: 29.6 PG (ref 26.5–33)
MCHC RBC AUTO-ENTMCNC: 33.9 G/DL (ref 31.5–36.5)
MCV RBC AUTO: 87 FL (ref 78–100)
MONOCYTES # BLD AUTO: 0.3 10E3/UL (ref 0–1.3)
MONOCYTES NFR BLD AUTO: 4 %
NEUTROPHILS # BLD AUTO: 6.3 10E3/UL (ref 1.6–8.3)
NEUTROPHILS NFR BLD AUTO: 84 %
NRBC # BLD AUTO: 0 10E3/UL
NRBC BLD AUTO-RTO: 0 /100
PLATELET # BLD AUTO: 388 10E3/UL (ref 150–450)
POTASSIUM SERPL-SCNC: 4.4 MMOL/L (ref 3.4–5.3)
RBC # BLD AUTO: 4.76 10E6/UL (ref 4.4–5.9)
SODIUM SERPL-SCNC: 136 MMOL/L (ref 135–145)
WBC # BLD AUTO: 7.5 10E3/UL (ref 4–11)

## 2025-02-02 PROCEDURE — 80048 BASIC METABOLIC PNL TOTAL CA: CPT | Mod: ZL | Performed by: REGISTERED NURSE

## 2025-02-02 PROCEDURE — 84520 ASSAY OF UREA NITROGEN: CPT | Mod: ZL | Performed by: REGISTERED NURSE

## 2025-02-02 PROCEDURE — 85041 AUTOMATED RBC COUNT: CPT | Mod: ZL | Performed by: REGISTERED NURSE

## 2025-02-02 PROCEDURE — 99214 OFFICE O/P EST MOD 30 MIN: CPT | Performed by: REGISTERED NURSE

## 2025-02-02 PROCEDURE — G0463 HOSPITAL OUTPT CLINIC VISIT: HCPCS | Mod: 25

## 2025-02-02 PROCEDURE — 36415 COLL VENOUS BLD VENIPUNCTURE: CPT | Mod: ZL | Performed by: REGISTERED NURSE

## 2025-02-02 PROCEDURE — 71046 X-RAY EXAM CHEST 2 VIEWS: CPT

## 2025-02-02 PROCEDURE — 85004 AUTOMATED DIFF WBC COUNT: CPT | Mod: ZL | Performed by: REGISTERED NURSE

## 2025-02-02 RX ORDER — PREDNISONE 20 MG/1
TABLET ORAL
Qty: 20 TABLET | Refills: 0 | Status: SHIPPED | OUTPATIENT
Start: 2025-02-02

## 2025-02-02 RX ORDER — ALBUTEROL SULFATE 0.83 MG/ML
2.5 SOLUTION RESPIRATORY (INHALATION)
Qty: 90 ML | Refills: 0 | Status: SHIPPED | OUTPATIENT
Start: 2025-02-02

## 2025-02-02 RX ORDER — ALBUTEROL SULFATE 0.83 MG/ML
2.5 SOLUTION RESPIRATORY (INHALATION)
Qty: 90 ML | Refills: 1 | Status: SHIPPED | OUTPATIENT
Start: 2025-02-02

## 2025-02-02 ASSESSMENT — PAIN SCALES - GENERAL: PAINLEVEL_OUTOF10: NO PAIN (0)

## 2025-02-02 ASSESSMENT — ENCOUNTER SYMPTOMS
APPETITE CHANGE: 1
ACTIVITY CHANGE: 0
GASTROINTESTINAL NEGATIVE: 1
NEUROLOGICAL NEGATIVE: 1
FEVER: 0
SHORTNESS OF BREATH: 1
COUGH: 1
WHEEZING: 1
MUSCULOSKELETAL NEGATIVE: 1
CHILLS: 0
CHEST TIGHTNESS: 1

## 2025-02-02 NOTE — PROGRESS NOTES
"Jayy Cole  1950    ASSESSMENT/PLAN:   1. Chronic obstructive pulmonary disease with acute exacerbation (H) (Primary)  2. History of pneumonia  3. Acute cough    - XR Chest 2 Views  - Basic Metabolic Panel  - CBC and Differential  - Nebulizer and Supplies Order for DME - ONLY FOR DME  - albuterol (PROVENTIL) (2.5 MG/3ML) 0.083% neb solution; Take 1 vial (2.5 mg) by nebulization two times daily.  Dispense: 90 mL; Refill: 1  - predniSONE (DELTASONE) 20 MG tablet; Take 3 tabs by mouth daily x 3 days, then 2 tabs daily x 3 days, then 1 tab daily x 3 days, then 1/2 tab daily x 3 days.  Dispense: 20 tablet; Refill: 0      Vital signs and physical exam reassuring today.  His chest x-ray did show resolution of the pneumonia.  Lung sounds coarse on exam, will treat with prednisone taper.  He should be using his albuterol inhaler more often.  It sounds like he often refuses medications. Will switch to scheduled nebulizers at [facility] and hopefully this will help break up his cough.  Discussed importance of smoking cessation.    Discussed emergent signs and symptoms to monitor for and when to seek follow up for any new or worsening symptoms. Patient and/or family agrees with plan of care and verbalizes understating. AVS offered and printed if patient requested. Patient education provided verbally and written instructions provided.     SUBJECTIVE:   CHIEF COMPLAINT/ REASON FOR VISIT  Patient presents with:  Shortness of Breath  Dizziness     Jayy Cole is a pleasant 74 year old male presents to [facility] today with staff from [facility].  Complaints: \"I cannot get air.\"  He was evaluated in the ED on 01/09, diagnosed with COPD exacerbation and multifocal pneumonia.  He received IV antibiotics and was discharged with Augmentin for 10 days.  On 01/23/2025 he was seen in follow-up in clinic with his PCP.  Chest x-ray at that time only showed minimal resolution of pneumonia.  He was placed on " "prednisone and Levaquin.  He is a smoker.  He occasionally uses his albuterol inhaler but not consistently.  He is here today with staff who help provide patient history.      History provided by patient and staff from [facility].    I have reviewed the nursing notes.  I have reviewed allergies, medication list, problem list, and past medical history.    REVIEW OF SYSTEMS  Review of Systems   Constitutional:  Positive for appetite change. Negative for activity change, chills and fever.   HENT: Negative.     Respiratory:  Positive for cough, chest tightness, shortness of breath and wheezing.    Cardiovascular:  Negative for chest pain.   Gastrointestinal: Negative.    Genitourinary: Negative.    Musculoskeletal: Negative.    Neurological: Negative.         VITAL SIGNS  Vitals:    02/02/25 1200   BP: 90/60   Pulse: 106   Resp: 24   Temp: 98.6  F (37  C)   TempSrc: Tympanic   SpO2: 96%   Weight: 55.3 kg (122 lb)   Height: 1.715 m (5' 7.5\")      Body mass index is 18.83 kg/m .    OBJECTIVE:   PHYSICAL EXAM  Physical Exam  Vitals and nursing note reviewed.   Constitutional:       General: He is not in acute distress.     Appearance: Normal appearance. He is not ill-appearing.   HENT:      Right Ear: Tympanic membrane normal.      Left Ear: Tympanic membrane normal.      Nose: No congestion or rhinorrhea.      Mouth/Throat:      Pharynx: No posterior oropharyngeal erythema.   Cardiovascular:      Rate and Rhythm: Regular rhythm. Tachycardia present.   Pulmonary:      Effort: Pulmonary effort is normal. No respiratory distress.      Breath sounds: Wheezing, rhonchi and rales present.   Skin:     General: Skin is warm and dry.      Findings: No rash.   Neurological:      General: No focal deficit present.      Mental Status: He is alert.   Psychiatric:         Mood and Affect: Mood normal.          DIAGNOSTICS  Results for orders placed or performed in visit on 02/02/25   XR Chest 2 Views     Status: None    " Narrative    EXAM: XR CHEST 2 VIEWS  LOCATION: [facility]  DATE: 2/2/2025    INDICATION:  Acute cough, Chronic obstructive pulmonary disease with acute exacerbation (H), Multifocal pneumonia  COMPARISON: 1/23/2025      Impression    IMPRESSION: No pneumothorax or pleural effusion. No focal consolidation. Cardiac silhouette within normal limits. Mild hyperaeration. No acute osseous abnormality.   Basic Metabolic Panel     Status: Abnormal   Result Value Ref Range    Sodium 136 135 - 145 mmol/L    Potassium 4.4 3.4 - 5.3 mmol/L    Chloride 104 98 - 107 mmol/L    Carbon Dioxide (CO2) 25 22 - 29 mmol/L    Anion Gap 7 7 - 15 mmol/L    Urea Nitrogen 18.0 8.0 - 23.0 mg/dL    Creatinine 0.71 0.67 - 1.17 mg/dL    GFR Estimate >90 >60 mL/min/1.73m2    Calcium 8.9 8.8 - 10.4 mg/dL    Glucose 127 (H) 70 - 99 mg/dL   CBC with platelets and differential     Status: Abnormal   Result Value Ref Range    WBC Count 7.5 4.0 - 11.0 10e3/uL    RBC Count 4.76 4.40 - 5.90 10e6/uL    Hemoglobin 14.1 13.3 - 17.7 g/dL    Hematocrit 41.6 40.0 - 53.0 %    MCV 87 78 - 100 fL    MCH 29.6 26.5 - 33.0 pg    MCHC 33.9 31.5 - 36.5 g/dL    RDW 16.3 (H) 10.0 - 15.0 %    Platelet Count 388 150 - 450 10e3/uL    % Neutrophils 84 %    % Lymphocytes 9 %    % Monocytes 4 %    % Eosinophils 2 %    % Basophils 1 %    % Immature Granulocytes 1 %    NRBCs per 100 WBC 0 <1 /100    Absolute Neutrophils 6.3 1.6 - 8.3 10e3/uL    Absolute Lymphocytes 0.7 (L) 0.8 - 5.3 10e3/uL    Absolute Monocytes 0.3 0.0 - 1.3 10e3/uL    Absolute Eosinophils 0.1 0.0 - 0.7 10e3/uL    Absolute Basophils 0.1 0.0 - 0.2 10e3/uL    Absolute Immature Granulocytes 0.1 <=0.4 10e3/uL    Absolute NRBCs 0.0 10e3/uL   CBC and Differential     Status: Abnormal    Narrative    The following orders were created for panel order CBC and Differential.  Procedure                               Abnormality         Status                     ---------                                -----------         ------                     CBC with platelets and d...[[id number]]  Abnormal            Final result                 Please view results for these tests on the individual orders.        MOUNIKA Fleming [facility]

## 2025-02-02 NOTE — PATIENT INSTRUCTIONS
Your chest x-ray today did show improvement of your pneumonia.  I have sent over an albuterol nebs to the pharmacy for you.  This will be scheduled twice daily at [facility].  It is important that you continue to use this or your albuterol inhaler to help with your shortness of breath.  I will have you do another course of prednisone as well.    Prescriptions have been sent to [facility] pharmacy.    Please follow-up for any new or worsening concerns.

## 2025-03-20 DIAGNOSIS — J44.1 CHRONIC OBSTRUCTIVE PULMONARY DISEASE WITH ACUTE EXACERBATION (H): ICD-10-CM

## 2025-03-20 DIAGNOSIS — Z87.01 HISTORY OF PNEUMONIA: ICD-10-CM

## 2025-03-20 RX ORDER — ALBUTEROL SULFATE 0.83 MG/ML
2.5 SOLUTION RESPIRATORY (INHALATION)
Qty: 90 ML | Refills: 1 | Status: SHIPPED | OUTPATIENT
Start: 2025-03-20

## 2025-03-20 NOTE — TELEPHONE ENCOUNTER
[facility] pharmacy sent Rx request for the following:      Requested Prescriptions   Pending Prescriptions Disp Refills    albuterol (PROVENTIL) (2.5 MG/3ML) 0.083% neb solution 90 mL 1     Sig: Take 1 vial (2.5 mg) by nebulization two times daily.       Short-Acting Beta Agonist Inhalers Protocol  Passed - 3/20/2025  9:36 AM     Last Prescription Date:   02/02/2025  Last Fill Qty/Refills:         90 ml, R-1    Last Office Visit:              01/23/2025   Future Office visit:           None  Prescription approved per [facility] Refill Protocol.

## 2025-06-09 DIAGNOSIS — M81.0 OSTEOPOROSIS, UNSPECIFIED OSTEOPOROSIS TYPE, UNSPECIFIED PATHOLOGICAL FRACTURE PRESENCE: ICD-10-CM

## 2025-06-09 DIAGNOSIS — J44.9 CHRONIC OBSTRUCTIVE PULMONARY DISEASE, UNSPECIFIED COPD TYPE (H): ICD-10-CM

## 2025-06-12 DIAGNOSIS — H04.123 DRY EYES: ICD-10-CM

## 2025-06-16 RX ORDER — ALENDRONATE SODIUM 70 MG/1
70 TABLET ORAL
Qty: 12 TABLET | Refills: 0 | Status: SHIPPED | OUTPATIENT
Start: 2025-06-16

## 2025-06-16 RX ORDER — ALBUTEROL SULFATE 90 UG/1
2 AEROSOL, METERED RESPIRATORY (INHALATION) 2 TIMES DAILY
Qty: 54 G | Refills: 0 | Status: SHIPPED | OUTPATIENT
Start: 2025-06-16

## 2025-06-16 NOTE — TELEPHONE ENCOUNTER
[id number] ([place] of [place] sent Rx request for the following:      Requested Prescriptions   Pending Prescriptions Disp Refills    alendronate (FOSAMAX) 70 MG tablet [Pharmacy Med Name: ALENDRONATE 70MG TABLET] 12 tablet 4     Sig: TAKE 1 TABLET (70 MG) BY MOUTH EVERY 7 DAYS - TAKES ON FRIDAYS   Last Prescription Date:   6/7/21  Last Fill Qty/Refills:         12, R-4      Bisphosphonates Failed - 6/16/2025  2:07 PM        Failed - Dexa scan completed in the past 48-months     Please review last Dexa result.        VENTOLIN  (90 Base) MCG/ACT inhaler [Pharmacy Med Name: VENTOLIN HFA 90MCG/ACT INHALER] 18 g 11     Sig: INHALE 2 PUFFS INTO THE LUNGS 2 TIMES DAILY   Last Prescription Date:   6/7/24  Last Fill Qty/Refills:         18 g, R-11      Last Office Visit:                1/23/25  (COPD discussed)   6/7/24 (Px)    Future Office visit:           None    Pt due for annual exam. Routing to provider for refill consideration. Routing to Unit scheduling pool, to assist Pt in scheduling appointment. Unable to complete prescription refill per RN Medication Refill Policy. Odilia Glynn, Refill RN .............. 6/16/2025  2:10 PM

## 2025-06-17 RX ORDER — LATANOPROST 50 UG/ML
SOLUTION/ DROPS OPHTHALMIC
Qty: 7.5 ML | Refills: 5 | Status: SHIPPED | OUTPATIENT
Start: 2025-06-17

## 2025-06-17 NOTE — TELEPHONE ENCOUNTER
latanoprost (XALATAN) 0.005 % ophthalmic solution       Last Written Prescription Date:  6/7/24  Last Fill Quantity: 7.5ml,   # refills: 11  Last Office Visit: 1/23/25  Future Office visit:       Routing refill request to provider for review/approval because:  Drug not on the [facility], P or [facility] refill protocol or controlled substance Cydney Morgan RN on 6/17/2025 at 4:10 PM

## 2025-07-14 DIAGNOSIS — Z87.01 HISTORY OF PNEUMONIA: ICD-10-CM

## 2025-07-14 DIAGNOSIS — J44.1 CHRONIC OBSTRUCTIVE PULMONARY DISEASE WITH ACUTE EXACERBATION (H): ICD-10-CM

## 2025-07-15 RX ORDER — ALBUTEROL SULFATE 0.83 MG/ML
2.5 SOLUTION RESPIRATORY (INHALATION)
Qty: 90 ML | Refills: 4 | Status: SHIPPED | OUTPATIENT
Start: 2025-07-15

## 2025-07-15 NOTE — TELEPHONE ENCOUNTER
[id number] ([facility] of [facility] [place] sent Rx request for the following:      Requested Prescriptions   Pending Prescriptions Disp Refills    albuterol (PROVENTIL) (2.5 MG/3ML) 0.083% neb solution [Pharmacy Med Name: ALBUTEROL 0.083% SOLN FOR INH] 90 mL 4     Sig: TAKE 1 VIAL (2.5 MG) BY NEBULIZATION TWO TIMES DAILY.       Short-Acting Beta Agonist Inhalers Protocol  Passed - 7/15/2025 11:56 AM          Last Prescription Date:   4/11/25  Last Fill Qty/Refills:         90, R-4    Last Office Visit:              1/23/25   Future Office visit:           none    Prescription approved per [facility] Refill Protocol.    MAG CALDERON RN on 7/15/2025 at 11:58 AM

## 2025-08-19 ENCOUNTER — OFFICE VISIT (OUTPATIENT)
Dept: FAMILY MEDICINE | Facility: OTHER | Age: 75
End: 2025-08-19
Attending: NURSE PRACTITIONER
Payer: MEDICARE

## 2025-08-19 ENCOUNTER — RESULTS FOLLOW-UP (OUTPATIENT)
Dept: FAMILY MEDICINE | Facility: OTHER | Age: 75
End: 2025-08-19

## 2025-08-19 VITALS
RESPIRATION RATE: 24 BRPM | HEART RATE: 92 BPM | OXYGEN SATURATION: 94 % | DIASTOLIC BLOOD PRESSURE: 64 MMHG | TEMPERATURE: 98.2 F | SYSTOLIC BLOOD PRESSURE: 100 MMHG | WEIGHT: 124.8 LBS | HEIGHT: 68 IN | BODY MASS INDEX: 18.91 KG/M2

## 2025-08-19 DIAGNOSIS — I42.9 CARDIOMYOPATHY, UNSPECIFIED TYPE (H): ICD-10-CM

## 2025-08-19 DIAGNOSIS — F20.9 SCHIZOPHRENIA, UNSPECIFIED TYPE (H): ICD-10-CM

## 2025-08-19 DIAGNOSIS — I47.10 SUPRAVENTRICULAR TACHYCARDIA: ICD-10-CM

## 2025-08-19 DIAGNOSIS — Z00.00 ENCOUNTER FOR MEDICARE ANNUAL WELLNESS EXAM: ICD-10-CM

## 2025-08-19 DIAGNOSIS — M81.0 OSTEOPOROSIS, UNSPECIFIED OSTEOPOROSIS TYPE, UNSPECIFIED PATHOLOGICAL FRACTURE PRESENCE: ICD-10-CM

## 2025-08-19 DIAGNOSIS — J44.9 CHRONIC OBSTRUCTIVE PULMONARY DISEASE, UNSPECIFIED COPD TYPE (H): ICD-10-CM

## 2025-08-19 DIAGNOSIS — R29.90 STROKE-LIKE SYMPTOMS: ICD-10-CM

## 2025-08-19 DIAGNOSIS — Z13.220 LIPID SCREENING: Primary | ICD-10-CM

## 2025-08-19 DIAGNOSIS — Z86.73 HISTORY OF STROKE: ICD-10-CM

## 2025-08-19 LAB
ALBUMIN SERPL BCG-MCNC: 4 G/DL (ref 3.5–5.2)
ALP SERPL-CCNC: 72 U/L (ref 40–150)
ALT SERPL W P-5'-P-CCNC: 13 U/L (ref 0–70)
ANION GAP SERPL CALCULATED.3IONS-SCNC: 9 MMOL/L (ref 7–15)
AST SERPL W P-5'-P-CCNC: 17 U/L (ref 0–45)
BILIRUB SERPL-MCNC: 0.4 MG/DL
BUN SERPL-MCNC: 11.4 MG/DL (ref 8–23)
CALCIUM SERPL-MCNC: 8.8 MG/DL (ref 8.8–10.4)
CHLORIDE SERPL-SCNC: 104 MMOL/L (ref 98–107)
CHOLEST SERPL-MCNC: 147 MG/DL
CREAT SERPL-MCNC: 0.66 MG/DL (ref 0.67–1.17)
EGFRCR SERPLBLD CKD-EPI 2021: >90 ML/MIN/1.73M2
FASTING STATUS PATIENT QL REPORTED: YES
FASTING STATUS PATIENT QL REPORTED: YES
GLUCOSE SERPL-MCNC: 94 MG/DL (ref 70–99)
HCO3 SERPL-SCNC: 26 MMOL/L (ref 22–29)
HDLC SERPL-MCNC: 60 MG/DL
LDLC SERPL CALC-MCNC: 75 MG/DL
NONHDLC SERPL-MCNC: 87 MG/DL
POTASSIUM SERPL-SCNC: 4.3 MMOL/L (ref 3.4–5.3)
PROT SERPL-MCNC: 7 G/DL (ref 6.4–8.3)
SODIUM SERPL-SCNC: 139 MMOL/L (ref 135–145)
TRIGL SERPL-MCNC: 61 MG/DL

## 2025-08-19 PROCEDURE — 80061 LIPID PANEL: CPT | Mod: ZL | Performed by: NURSE PRACTITIONER

## 2025-08-19 PROCEDURE — 80053 COMPREHEN METABOLIC PANEL: CPT | Mod: ZL | Performed by: NURSE PRACTITIONER

## 2025-08-19 PROCEDURE — G0463 HOSPITAL OUTPT CLINIC VISIT: HCPCS

## 2025-08-19 PROCEDURE — 36415 COLL VENOUS BLD VENIPUNCTURE: CPT | Mod: ZL | Performed by: NURSE PRACTITIONER

## 2025-08-19 RX ORDER — FLUTICASONE PROPIONATE AND SALMETEROL 500; 50 UG/1; UG/1
1 POWDER RESPIRATORY (INHALATION) EVERY 12 HOURS
Qty: 60 EACH | Refills: 11 | Status: SHIPPED | OUTPATIENT
Start: 2025-08-19

## 2025-08-19 RX ORDER — ATORVASTATIN CALCIUM 40 MG/1
40 TABLET, FILM COATED ORAL AT BEDTIME
Qty: 90 TABLET | Refills: 4 | Status: SHIPPED | OUTPATIENT
Start: 2025-08-19

## 2025-08-19 RX ORDER — CLOPIDOGREL BISULFATE 75 MG/1
75 TABLET ORAL EVERY MORNING
Qty: 90 TABLET | Refills: 4 | Status: SHIPPED | OUTPATIENT
Start: 2025-08-19

## 2025-08-19 RX ORDER — QUETIAPINE FUMARATE 300 MG/1
300 TABLET, FILM COATED ORAL AT BEDTIME
Qty: 90 TABLET | Refills: 4 | Status: SHIPPED | OUTPATIENT
Start: 2025-08-19

## 2025-08-19 RX ORDER — ALENDRONATE SODIUM 70 MG/1
70 TABLET ORAL
Qty: 12 TABLET | Refills: 4 | Status: SHIPPED | OUTPATIENT
Start: 2025-08-19

## 2025-08-19 RX ORDER — ALBUTEROL SULFATE 90 UG/1
2 INHALANT RESPIRATORY (INHALATION) 2 TIMES DAILY
Qty: 54 G | Refills: 4 | Status: SHIPPED | OUTPATIENT
Start: 2025-08-19

## 2025-08-19 SDOH — HEALTH STABILITY: PHYSICAL HEALTH: ON AVERAGE, HOW MANY DAYS PER WEEK DO YOU ENGAGE IN MODERATE TO STRENUOUS EXERCISE (LIKE A BRISK WALK)?: 7 DAYS

## 2025-08-19 ASSESSMENT — SOCIAL DETERMINANTS OF HEALTH (SDOH): HOW OFTEN DO YOU GET TOGETHER WITH FRIENDS OR RELATIVES?: ONCE A WEEK

## 2025-08-19 ASSESSMENT — PAIN SCALES - GENERAL: PAINLEVEL_OUTOF10: MODERATE PAIN (5)

## 2025-08-19 ASSESSMENT — COLUMBIA-SUICIDE SEVERITY RATING SCALE - C-SSRS
1. WITHIN THE PAST MONTH, HAVE YOU WISHED YOU WERE DEAD OR WISHED YOU COULD GO TO SLEEP AND NOT WAKE UP?: NO
2. IN THE PAST MONTH, HAVE YOU ACTUALLY HAD ANY THOUGHTS OF KILLING YOURSELF?: NO
6. HAVE YOU EVER DONE ANYTHING, STARTED TO DO ANYTHING, OR PREPARED TO DO ANYTHING TO END YOUR LIFE?: NO

## 2025-08-19 ASSESSMENT — PATIENT HEALTH QUESTIONNAIRE - PHQ9
10. IF YOU CHECKED OFF ANY PROBLEMS, HOW DIFFICULT HAVE THESE PROBLEMS MADE IT FOR YOU TO DO YOUR WORK, TAKE CARE OF THINGS AT HOME, OR GET ALONG WITH OTHER PEOPLE: NOT DIFFICULT AT ALL
SUM OF ALL RESPONSES TO PHQ QUESTIONS 1-9: 20
SUM OF ALL RESPONSES TO PHQ QUESTIONS 1-9: 20

## 2025-08-27 ENCOUNTER — TELEPHONE (OUTPATIENT)
Dept: FAMILY MEDICINE | Facility: OTHER | Age: 75
End: 2025-08-27
Payer: MEDICARE

## 2025-09-02 ENCOUNTER — TELEPHONE (OUTPATIENT)
Dept: FAMILY MEDICINE | Facility: OTHER | Age: 75
End: 2025-09-02
Payer: MEDICARE

## (undated) RX ORDER — CEFTRIAXONE SODIUM 1 G
VIAL (EA) INJECTION
Status: DISPENSED
Start: 2025-01-09

## (undated) RX ORDER — METHYLPREDNISOLONE SODIUM SUCCINATE 125 MG/2ML
INJECTION INTRAMUSCULAR; INTRAVENOUS
Status: DISPENSED
Start: 2025-01-09

## (undated) RX ORDER — IPRATROPIUM BROMIDE AND ALBUTEROL SULFATE 2.5; .5 MG/3ML; MG/3ML
SOLUTION RESPIRATORY (INHALATION)
Status: DISPENSED
Start: 2025-01-09

## (undated) RX ORDER — BUDESONIDE 0.5 MG/2ML
INHALANT ORAL
Status: DISPENSED
Start: 2025-01-09

## (undated) RX ORDER — AZITHROMYCIN 500 MG/5ML
INJECTION, POWDER, LYOPHILIZED, FOR SOLUTION INTRAVENOUS
Status: DISPENSED
Start: 2025-01-09